# Patient Record
Sex: MALE | Race: ASIAN | URBAN - METROPOLITAN AREA
[De-identification: names, ages, dates, MRNs, and addresses within clinical notes are randomized per-mention and may not be internally consistent; named-entity substitution may affect disease eponyms.]

---

## 2018-02-01 ENCOUNTER — APPOINTMENT (OUTPATIENT)
Dept: GENERAL RADIOLOGY | Facility: CLINIC | Age: 78
DRG: 638 | End: 2018-02-01
Attending: INTERNAL MEDICINE
Payer: MEDICARE

## 2018-02-01 ENCOUNTER — TRANSFERRED RECORDS (OUTPATIENT)
Dept: HEALTH INFORMATION MANAGEMENT | Facility: CLINIC | Age: 78
End: 2018-02-01

## 2018-02-01 ENCOUNTER — HOSPITAL ENCOUNTER (INPATIENT)
Facility: CLINIC | Age: 78
LOS: 4 days | Discharge: HOME | DRG: 638 | End: 2018-02-05
Attending: INTERNAL MEDICINE | Admitting: INTERNAL MEDICINE
Payer: MEDICARE

## 2018-02-01 DIAGNOSIS — E11.00 TYPE 2 DIABETES MELLITUS WITH HYPEROSMOLARITY WITHOUT COMA, UNSPECIFIED LONG TERM INSULIN USE STATUS: Primary | ICD-10-CM

## 2018-02-01 DIAGNOSIS — E13.10 DIABETIC KETOACIDOSIS WITHOUT COMA ASSOCIATED WITH OTHER SPECIFIED DIABETES MELLITUS (H): ICD-10-CM

## 2018-02-01 PROBLEM — E11.10 DKA (DIABETIC KETOACIDOSES): Status: ACTIVE | Noted: 2018-02-01

## 2018-02-01 LAB
ALBUMIN UR-MCNC: NEGATIVE MG/DL
ANION GAP SERPL CALCULATED.3IONS-SCNC: 10 MMOL/L (ref 3–14)
ANION GAP SERPL CALCULATED.3IONS-SCNC: 14 MMOL/L (ref 3–14)
APPEARANCE UR: CLEAR
BASE DEFICIT BLDV-SCNC: 0.9 MMOL/L
BASOPHILS # BLD AUTO: 0.1 10E9/L (ref 0–0.2)
BASOPHILS NFR BLD AUTO: 0.6 %
BILIRUB UR QL STRIP: NEGATIVE
BUN SERPL-MCNC: 39 MG/DL (ref 7–30)
BUN SERPL-MCNC: 46 MG/DL (ref 7–30)
CALCIUM SERPL-MCNC: 9 MG/DL (ref 8.5–10.1)
CALCIUM SERPL-MCNC: 9.7 MG/DL (ref 8.5–10.1)
CHLORIDE SERPL-SCNC: 110 MMOL/L (ref 94–109)
CHLORIDE SERPL-SCNC: 93 MMOL/L (ref 94–109)
CO2 SERPL-SCNC: 25 MMOL/L (ref 20–32)
CO2 SERPL-SCNC: 26 MMOL/L (ref 20–32)
COLOR UR AUTO: ABNORMAL
CREAT SERPL-MCNC: 1.27 MG/DL (ref 0.66–1.25)
CREAT SERPL-MCNC: 1.63 MG/DL (ref 0.66–1.25)
DIFFERENTIAL METHOD BLD: ABNORMAL
EOSINOPHIL # BLD AUTO: 0 10E9/L (ref 0–0.7)
EOSINOPHIL NFR BLD AUTO: 0.1 %
ERYTHROCYTE [DISTWIDTH] IN BLOOD BY AUTOMATED COUNT: 11.8 % (ref 10–15)
GFR SERPL CREATININE-BSD FRML MDRD: 41 ML/MIN/1.7M2
GFR SERPL CREATININE-BSD FRML MDRD: 55 ML/MIN/1.7M2
GLUCOSE BLDC GLUCOMTR-MCNC: 202 MG/DL (ref 70–99)
GLUCOSE BLDC GLUCOMTR-MCNC: 274 MG/DL (ref 70–99)
GLUCOSE BLDC GLUCOMTR-MCNC: 282 MG/DL (ref 70–99)
GLUCOSE BLDC GLUCOMTR-MCNC: 370 MG/DL (ref 70–99)
GLUCOSE BLDC GLUCOMTR-MCNC: 442 MG/DL (ref 70–99)
GLUCOSE BLDC GLUCOMTR-MCNC: 503 MG/DL (ref 70–99)
GLUCOSE BLDC GLUCOMTR-MCNC: 514 MG/DL (ref 70–99)
GLUCOSE BLDC GLUCOMTR-MCNC: >600 MG/DL (ref 70–99)
GLUCOSE SERPL-MCNC: 423 MG/DL (ref 70–99)
GLUCOSE SERPL-MCNC: 673 MG/DL (ref 70–99)
GLUCOSE SERPL-MCNC: 944 MG/DL (ref 70–99)
GLUCOSE UR STRIP-MCNC: >499 MG/DL
HBA1C MFR BLD: 12.5 % (ref 4.3–6)
HCO3 BLDV-SCNC: 26 MMOL/L (ref 21–28)
HCT VFR BLD AUTO: 44.7 % (ref 40–53)
HGB BLD-MCNC: 14.7 G/DL (ref 13.3–17.7)
HGB UR QL STRIP: ABNORMAL
IMM GRANULOCYTES # BLD: 0.1 10E9/L (ref 0–0.4)
IMM GRANULOCYTES NFR BLD: 0.5 %
KETONES BLD-SCNC: 3.2 MMOL/L (ref 0–0.6)
KETONES UR STRIP-MCNC: 20 MG/DL
LEUKOCYTE ESTERASE UR QL STRIP: NEGATIVE
LYMPHOCYTES # BLD AUTO: 0.8 10E9/L (ref 0.8–5.3)
LYMPHOCYTES NFR BLD AUTO: 6.2 %
MAGNESIUM SERPL-MCNC: 3 MG/DL (ref 1.6–2.3)
MCH RBC QN AUTO: 31 PG (ref 26.5–33)
MCHC RBC AUTO-ENTMCNC: 32.9 G/DL (ref 31.5–36.5)
MCV RBC AUTO: 94 FL (ref 78–100)
MONOCYTES # BLD AUTO: 0.5 10E9/L (ref 0–1.3)
MONOCYTES NFR BLD AUTO: 4.2 %
NEUTROPHILS # BLD AUTO: 11.2 10E9/L (ref 1.6–8.3)
NEUTROPHILS NFR BLD AUTO: 88.4 %
NITRATE UR QL: NEGATIVE
NRBC # BLD AUTO: 0 10*3/UL
NRBC BLD AUTO-RTO: 0 /100
O2/TOTAL GAS SETTING VFR VENT: ABNORMAL %
OXYHGB MFR BLDV: 53 %
PCO2 BLDV: 52 MM HG (ref 40–50)
PH BLDV: 7.31 PH (ref 7.32–7.43)
PH UR STRIP: 5 PH (ref 5–7)
PHOSPHATE SERPL-MCNC: 2.5 MG/DL (ref 2.5–4.5)
PLATELET # BLD AUTO: 262 10E9/L (ref 150–450)
PO2 BLDV: 31 MM HG (ref 25–47)
POTASSIUM SERPL-SCNC: 4.1 MMOL/L (ref 3.4–5.3)
POTASSIUM SERPL-SCNC: 4.6 MMOL/L (ref 3.4–5.3)
RBC # BLD AUTO: 4.74 10E12/L (ref 4.4–5.9)
RBC #/AREA URNS AUTO: 1 /HPF (ref 0–2)
SODIUM SERPL-SCNC: 132 MMOL/L (ref 133–144)
SODIUM SERPL-SCNC: 146 MMOL/L (ref 133–144)
SOURCE: ABNORMAL
SP GR UR STRIP: 1.03 (ref 1–1.03)
UROBILINOGEN UR STRIP-MCNC: 0 MG/DL (ref 0–2)
WBC # BLD AUTO: 12.7 10E9/L (ref 4–11)
WBC #/AREA URNS AUTO: 1 /HPF (ref 0–2)

## 2018-02-01 PROCEDURE — 40000275 ZZH STATISTIC RCP TIME EA 10 MIN

## 2018-02-01 PROCEDURE — 25000128 H RX IP 250 OP 636: Performed by: INTERNAL MEDICINE

## 2018-02-01 PROCEDURE — 80048 BASIC METABOLIC PNL TOTAL CA: CPT | Performed by: INTERNAL MEDICINE

## 2018-02-01 PROCEDURE — 36415 COLL VENOUS BLD VENIPUNCTURE: CPT | Performed by: INTERNAL MEDICINE

## 2018-02-01 PROCEDURE — 82947 ASSAY GLUCOSE BLOOD QUANT: CPT | Performed by: INTERNAL MEDICINE

## 2018-02-01 PROCEDURE — 96361 HYDRATE IV INFUSION ADD-ON: CPT

## 2018-02-01 PROCEDURE — 99223 1ST HOSP IP/OBS HIGH 75: CPT | Mod: AI | Performed by: INTERNAL MEDICINE

## 2018-02-01 PROCEDURE — 25800025 ZZH RX 258: Performed by: INTERNAL MEDICINE

## 2018-02-01 PROCEDURE — 20000003 ZZH R&B ICU

## 2018-02-01 PROCEDURE — 25000131 ZZH RX MED GY IP 250 OP 636 PS 637: Performed by: INTERNAL MEDICINE

## 2018-02-01 PROCEDURE — 71046 X-RAY EXAM CHEST 2 VIEWS: CPT

## 2018-02-01 PROCEDURE — 96365 THER/PROPH/DIAG IV INF INIT: CPT

## 2018-02-01 PROCEDURE — 96366 THER/PROPH/DIAG IV INF ADDON: CPT

## 2018-02-01 PROCEDURE — 25000131 ZZH RX MED GY IP 250 OP 636 PS 637: Mod: GY | Performed by: INTERNAL MEDICINE

## 2018-02-01 PROCEDURE — 27210995 ZZH RX 272: Performed by: INTERNAL MEDICINE

## 2018-02-01 PROCEDURE — 83735 ASSAY OF MAGNESIUM: CPT | Performed by: INTERNAL MEDICINE

## 2018-02-01 PROCEDURE — 82010 KETONE BODYS QUAN: CPT | Performed by: INTERNAL MEDICINE

## 2018-02-01 PROCEDURE — 93005 ELECTROCARDIOGRAM TRACING: CPT

## 2018-02-01 PROCEDURE — 00000146 ZZHCL STATISTIC GLUCOSE BY METER IP

## 2018-02-01 PROCEDURE — 85025 COMPLETE CBC W/AUTO DIFF WBC: CPT | Performed by: INTERNAL MEDICINE

## 2018-02-01 PROCEDURE — 99285 EMERGENCY DEPT VISIT HI MDM: CPT | Mod: 25

## 2018-02-01 PROCEDURE — 84100 ASSAY OF PHOSPHORUS: CPT | Performed by: INTERNAL MEDICINE

## 2018-02-01 PROCEDURE — 83036 HEMOGLOBIN GLYCOSYLATED A1C: CPT | Performed by: INTERNAL MEDICINE

## 2018-02-01 PROCEDURE — 93010 ELECTROCARDIOGRAM REPORT: CPT | Performed by: INTERNAL MEDICINE

## 2018-02-01 PROCEDURE — 81001 URINALYSIS AUTO W/SCOPE: CPT | Performed by: INTERNAL MEDICINE

## 2018-02-01 PROCEDURE — 82805 BLOOD GASES W/O2 SATURATION: CPT | Performed by: INTERNAL MEDICINE

## 2018-02-01 PROCEDURE — 25000125 ZZHC RX 250: Performed by: INTERNAL MEDICINE

## 2018-02-01 RX ORDER — POTASSIUM CHLORIDE 1.5 G/1.58G
20-40 POWDER, FOR SOLUTION ORAL
Status: DISCONTINUED | OUTPATIENT
Start: 2018-02-01 | End: 2018-02-05 | Stop reason: HOSPADM

## 2018-02-01 RX ORDER — DEXTROSE MONOHYDRATE 25 G/50ML
25-50 INJECTION, SOLUTION INTRAVENOUS
Status: DISCONTINUED | OUTPATIENT
Start: 2018-02-01 | End: 2018-02-05 | Stop reason: HOSPADM

## 2018-02-01 RX ORDER — POTASSIUM CHLORIDE 1500 MG/1
20-40 TABLET, EXTENDED RELEASE ORAL
Status: DISCONTINUED | OUTPATIENT
Start: 2018-02-01 | End: 2018-02-05 | Stop reason: HOSPADM

## 2018-02-01 RX ORDER — DEXTROSE MONOHYDRATE, SODIUM CHLORIDE, AND POTASSIUM CHLORIDE 50; 1.49; 4.5 G/1000ML; G/1000ML; G/1000ML
INJECTION, SOLUTION INTRAVENOUS CONTINUOUS
Status: DISCONTINUED | OUTPATIENT
Start: 2018-02-01 | End: 2018-02-02

## 2018-02-01 RX ORDER — NITROGLYCERIN 0.4 MG/1
0.4 TABLET SUBLINGUAL EVERY 5 MIN PRN
Status: DISCONTINUED | OUTPATIENT
Start: 2018-02-01 | End: 2018-02-05 | Stop reason: HOSPADM

## 2018-02-01 RX ORDER — MAGNESIUM SULFATE HEPTAHYDRATE 40 MG/ML
4 INJECTION, SOLUTION INTRAVENOUS EVERY 4 HOURS PRN
Status: DISCONTINUED | OUTPATIENT
Start: 2018-02-01 | End: 2018-02-05 | Stop reason: HOSPADM

## 2018-02-01 RX ORDER — LOSARTAN POTASSIUM AND HYDROCHLOROTHIAZIDE 12.5; 1 MG/1; MG/1
1 TABLET ORAL AT BEDTIME
COMMUNITY

## 2018-02-01 RX ORDER — SODIUM CHLORIDE 450 MG/100ML
1000 INJECTION, SOLUTION INTRAVENOUS CONTINUOUS
Status: DISCONTINUED | OUTPATIENT
Start: 2018-02-01 | End: 2018-02-01

## 2018-02-01 RX ORDER — POTASSIUM CHLORIDE 7.45 MG/ML
10 INJECTION INTRAVENOUS
Status: DISCONTINUED | OUTPATIENT
Start: 2018-02-01 | End: 2018-02-05 | Stop reason: HOSPADM

## 2018-02-01 RX ORDER — SODIUM CHLORIDE 450 MG/100ML
INJECTION, SOLUTION INTRAVENOUS CONTINUOUS
Status: DISCONTINUED | OUTPATIENT
Start: 2018-02-01 | End: 2018-02-02

## 2018-02-01 RX ORDER — POTASSIUM CL/LIDO/0.9 % NACL 10MEQ/0.1L
10 INTRAVENOUS SOLUTION, PIGGYBACK (ML) INTRAVENOUS
Status: DISCONTINUED | OUTPATIENT
Start: 2018-02-01 | End: 2018-02-05 | Stop reason: HOSPADM

## 2018-02-01 RX ORDER — NICOTINE POLACRILEX 4 MG
15-30 LOZENGE BUCCAL
Status: DISCONTINUED | OUTPATIENT
Start: 2018-02-01 | End: 2018-02-05 | Stop reason: HOSPADM

## 2018-02-01 RX ORDER — POTASSIUM CHLORIDE 29.8 MG/ML
20 INJECTION INTRAVENOUS
Status: DISCONTINUED | OUTPATIENT
Start: 2018-02-01 | End: 2018-02-05 | Stop reason: HOSPADM

## 2018-02-01 RX ORDER — LIDOCAINE 40 MG/G
CREAM TOPICAL
Status: DISCONTINUED | OUTPATIENT
Start: 2018-02-01 | End: 2018-02-05 | Stop reason: HOSPADM

## 2018-02-01 RX ADMIN — SODIUM CHLORIDE 1000 ML: 9 INJECTION, SOLUTION INTRAVENOUS at 18:54

## 2018-02-01 RX ADMIN — SODIUM CHLORIDE 8 UNITS/HR: 9 INJECTION, SOLUTION INTRAVENOUS at 22:07

## 2018-02-01 RX ADMIN — SODIUM CHLORIDE 1000 ML: 9 INJECTION, SOLUTION INTRAVENOUS at 13:16

## 2018-02-01 RX ADMIN — SODIUM CHLORIDE 5.5 UNITS/HR: 9 INJECTION, SOLUTION INTRAVENOUS at 18:52

## 2018-02-01 RX ADMIN — SODIUM CHLORIDE 1000 ML: 9 INJECTION, SOLUTION INTRAVENOUS at 15:36

## 2018-02-01 RX ADMIN — SODIUM CHLORIDE: 4.5 INJECTION, SOLUTION INTRAVENOUS at 19:57

## 2018-02-01 RX ADMIN — POTASSIUM CHLORIDE, DEXTROSE MONOHYDRATE AND SODIUM CHLORIDE: 150; 5; 450 INJECTION, SOLUTION INTRAVENOUS at 23:14

## 2018-02-01 RX ADMIN — SODIUM CHLORIDE 5.5 UNITS/HR: 9 INJECTION, SOLUTION INTRAVENOUS at 14:11

## 2018-02-01 ASSESSMENT — ACTIVITIES OF DAILY LIVING (ADL)
COGNITION: 0 - NO COGNITION ISSUES REPORTED
AMBULATION: 0-->INDEPENDENT
TRANSFERRING: 0-->INDEPENDENT
FALL_HISTORY_WITHIN_LAST_SIX_MONTHS: NO
SWALLOWING: 0-->SWALLOWS FOODS/LIQUIDS WITHOUT DIFFICULTY
TOILETING: 0-->INDEPENDENT
BATHING: 0-->INDEPENDENT
DRESS: 0-->INDEPENDENT
RETIRED_EATING: 0-->INDEPENDENT
RETIRED_COMMUNICATION: 0-->UNDERSTANDS/COMMUNICATES WITHOUT DIFFICULTY

## 2018-02-01 ASSESSMENT — ENCOUNTER SYMPTOMS
FLANK PAIN: 0
ABDOMINAL PAIN: 0
SHORTNESS OF BREATH: 0
POLYDIPSIA: 1
APPETITE CHANGE: 1
FEVER: 0
FATIGUE: 1

## 2018-02-01 NOTE — H&P
[facility], [place] History and Physical    Bun Avani MRN# [id number]   Age: 77 year old YOB: 1940     Date of Admission:  2/1/2018    Home clinic: [facility]   Primary care provider: Genet Pittman          Assessment and Plan:   Assessment:   Mr. Varma is a very pleasant 77-year-old [place]ian man who came to attention today with weakness and urinary frequency.  In the emergency department he is found to be significantly hyperglycemic and at least moderately dehydrated.  His glucose values were above the measurable threshold, anion gap was normal but he did have urinary ketones.    Notably the patient's prior medical history is negative for diagnosis of diabetes though he has been monitored in the past for hyperglycemia.  He has dyslipidemia and hypertension but otherwise is a quite healthy man.    Diagnoses:  1.  New diagnosis diabetes.  Patient presents  with severe hyperglycemia but not quite diabetic ketoacidosis despite ketonuria.  He has no evidence for anion gap acidosis but he is at least moderately dehydrated.  2.  Acute kidney injury.  Baseline creatinine probably about 1.0, on presentation to the outside clinic creatinine was 1.9.  3.  Hypertension.  This is typically managed on Hyzaar.  4.  Dyslipidemia.      Plan:   1.  Admit to intermediate care for insulin drip.  2.  Hold antihypertensives at this time.  3.  Diabetic ketoacidosis protocol to guide management overnight.  4.  Serial basic metabolic panel to include magnesium and phosphorus monitoring and replacement as needed every 6 hours.             Chief Complaint:   Fatigue, loss of appetite, urinary frequency present for the last 2 weeks.     History is obtained from the patient and EMR.    Mr. Chow primarily speaks Cambodian but is very happy to give a full history.  He has lived here since 1984 and indicates with some difficulty that he has been treated for hypertension and dyslipidemia.  This is confirmed on reviewing his  documentation from the [facility].    Patient clearly describes urinary frequency every 2 hours over the last couple of weeks.  He is really not had much of an appetite at all but has been drinking large volumes of water.  He feels it is soon as he drinks he needs to urinate again.  It is not completely clear that he has been losing weight though he suspects that he has been because his pants are slightly looser than usual.  He denies specific problems with blurry vision.  He has not had shortness of breath, cough, nausea/vomiting diarrhea or stool change.  He apparently is had a rather nonspecific loss of appetite though.  He denies significant dizziness but overall just feels extremely weak.  I believe that he has been taking his medications as usual.          Past Medical History:   Hypertension.  Dyslipidemia.         Past Surgical History:    No past surgical history on file.          Social History:     Social History   Substance Use Topics     Smoking status:  Lifelong non-smoker.     Smokeless tobacco:  None.     Alcohol use  nondrinker.             Family History:   No known relevant family medical history.         Allergies:   Not on File          Medications:     Prescriptions Prior to Admission   Medication Sig Dispense Refill Last Dose     losartan-hydrochlorothiazide (HYZAAR) 100-12.5 MG per tablet Take 1 tablet by mouth At Bedtime   1/31/2018 at Unknown time     Simvastatin (ZOCOR PO) Take 10 mg by mouth At Bedtime   1/31/2018 at Unknown time             Review of Systems:   A comprehensive review of systems was performed and found to be negative except as described in this note           Physical Exam:   Vitals were reviewed  Temp: 98.6  F (37  C) Temp src: Oral BP: 145/86 Pulse: 108 Heart Rate: 84 Resp: 14 SpO2: 99 % O2 Device: None (Room air)    Constitutional: Awake, alert, cooperative, no apparent distress, and appears stated age.  Eyes: Lids and lashes normal, pupils equal, round  and reactive to light, extra ocular muscles intact, sclera clear, conjunctiva normal.  ENT: Normocephalic, without obvious abnormality, atramatic, sinuses nontender on palpation, external ears without lesions, oral pharynx with moist mucus membranes, tonsils without erythema or exudates, gums normal and good dentition.  Neck: Supple, symmetrical, trachea midline, no adenopathy, thyroid symmetric, not enlarged and no tenderness, skin normal.  Hematologic / Lymphatic: No cervical lymphadenopathy and no supraclavicular lymphadenopathy.  Back: Symmetric, no curvature, spinous processes are non-tender on palpation, paraspinous muscles are non-tender on palpation, no costal vertebral tenderness.  Lungs: No increased work of breathing, good air exchange, clear to auscultation bilaterally, no crackles or wheezing.  Cardiovascular: Regular rate and rhythm, normal S1 and S2, no S3 or S4, and no murmur noted.  Abdomen: No scars, normal bowel sounds, soft, non-distended, non-tender, no masses palpated, no hepatosplenomegally.  Musculoskeletal: No redness, warmth, or swelling of the joints.  Full range of motion noted.  Motor strength is 5 out of 5 all extremities bilaterally.  Tone is normal.  Neurologic: Awake, alert, oriented to name, place and time.  Cranial nerves II-XII are grossly intact.  Motor is 5 out of 5 bilaterally.  Cerebellar finger to nose, heel to shin intact.  Sensory is intact.  Babinski down going, Romberg negative, and gait is normal.  Neuropsychiatric: Normal affect, mood, orientation, memory and insight.  Skin: No rashes, erythema, pallor, petechia or purpura.          Data:     Results for orders placed or performed during the hospital encounter of 02/01/18 (from the past 24 hour(s))   Glucose by meter   Result Value Ref Range    Glucose >600 (HH) 70 - 99 mg/dL   XR Chest 2 Views    Narrative    XR CHEST 2 VW 2/1/2018 1:34 PM    COMPARISON: None.    HISTORY: Diabetic ketoacidosis.      Impression     IMPRESSION: Mildly enlarged cardiac silhouette. No focal airspace  disease, pleural effusion or pneumothorax.    BARNEY DE LEON MD   Basic metabolic panel   Result Value Ref Range    Sodium 132 (L) 133 - 144 mmol/L    Potassium 4.6 3.4 - 5.3 mmol/L    Chloride 93 (L) 94 - 109 mmol/L    Carbon Dioxide 25 20 - 32 mmol/L    Anion Gap 14 3 - 14 mmol/L    Glucose 944 (HH) 70 - 99 mg/dL    Urea Nitrogen 46 (H) 7 - 30 mg/dL    Creatinine 1.63 (H) 0.66 - 1.25 mg/dL    GFR Estimate 41 (L) >60 mL/min/1.7m2    GFR Estimate If Black 50 (L) >60 mL/min/1.7m2    Calcium 9.7 8.5 - 10.1 mg/dL   CBC with platelets differential   Result Value Ref Range    WBC 12.7 (H) 4.0 - 11.0 10e9/L    RBC Count 4.74 4.4 - 5.9 10e12/L    Hemoglobin 14.7 13.3 - 17.7 g/dL    Hematocrit 44.7 40.0 - 53.0 %    MCV 94 78 - 100 fl    MCH 31.0 26.5 - 33.0 pg    MCHC 32.9 31.5 - 36.5 g/dL    RDW 11.8 10.0 - 15.0 %    Platelet Count 262 150 - 450 10e9/L    Diff Method Automated Method     % Neutrophils 88.4 %    % Lymphocytes 6.2 %    % Monocytes 4.2 %    % Eosinophils 0.1 %    % Basophils 0.6 %    % Immature Granulocytes 0.5 %    Nucleated RBCs 0 0 /100    Absolute Neutrophil 11.2 (H) 1.6 - 8.3 10e9/L    Absolute Lymphocytes 0.8 0.8 - 5.3 10e9/L    Absolute Monocytes 0.5 0.0 - 1.3 10e9/L    Absolute Eosinophils 0.0 0.0 - 0.7 10e9/L    Absolute Basophils 0.1 0.0 - 0.2 10e9/L    Abs Immature Granulocytes 0.1 0 - 0.4 10e9/L    Absolute Nucleated RBC 0.0    Hemoglobin A1c   Result Value Ref Range    Hemoglobin A1C 12.5 (H) 4.3 - 6.0 %   Ketone Beta-Hydroxybutyrate Quantitative   Result Value Ref Range    Ketone Quantitative 3.2 (HH) 0.0 - 0.6 mmol/L   Blood gas venous and oxyhgb   Result Value Ref Range    Ph Venous 7.31 (L) 7.32 - 7.43 pH    PCO2 Venous 52 (H) 40 - 50 mm Hg    PO2 Venous 31 25 - 47 mm Hg    Bicarbonate Venous 26 21 - 28 mmol/L    FIO2 JAUN     Oxyhemoglobin Venous 53 %    Base Deficit Venous 0.9 mmol/L   Glucose by meter   Result Value Ref  Range    Glucose >600 (HH) 70 - 99 mg/dL   UA with Microscopic reflex to Culture   Result Value Ref Range    Color Urine Straw     Appearance Urine Clear     Glucose Urine >499 (A) NEG^Negative mg/dL    Bilirubin Urine Negative NEG^Negative    Ketones Urine 20 (A) NEG^Negative mg/dL    Specific Gravity Urine 1.026 1.003 - 1.035    Blood Urine Small (A) NEG^Negative    pH Urine 5.0 5.0 - 7.0 pH    Protein Albumin Urine Negative NEG^Negative mg/dL    Urobilinogen mg/dL 0.0 0.0 - 2.0 mg/dL    Nitrite Urine Negative NEG^Negative    Leukocyte Esterase Urine Negative NEG^Negative    Source Midstream Urine     WBC Urine 1 0 - 2 /HPF    RBC Urine 1 0 - 2 /HPF   Glucose by meter   Result Value Ref Range    Glucose >600 (HH) 70 - 99 mg/dL   Glucose by meter   Result Value Ref Range    Glucose >600 (HH) 70 - 99 mg/dL   Glucose   Result Value Ref Range    Glucose 673 (HH) 70 - 99 mg/dL   Glucose by meter   Result Value Ref Range    Glucose 503 (HH) 70 - 99 mg/dL   Glucose by meter   Result Value Ref Range    Glucose 514 (HH) 70 - 99 mg/dL   Glucose by meter   Result Value Ref Range    Glucose 442 (H) 70 - 99 mg/dL       All imaging studies reviewed by me.      Attestation:  I have reviewed today's vital signs, notes, medications, labs and imaging.  Total time: 35 minutes     Morales Chung MD

## 2018-02-01 NOTE — ED NOTES
"[facility]  ED Nurse Handoff Report    Godfrey Varma is a 77 year old male   ED Chief complaint: Fatigue  . ED Diagnosis:   Final diagnoses:   Diabetic ketoacidosis without coma associated with other specified diabetes mellitus (H)     Allergies: Not on File    Code Status: Full Code  Activity level - Baseline/Home:  Independent. Activity Level - Current:   Independent. Lift room needed: No. Bariatric: No   Needed: No   Isolation: No. Infection: Not Applicable.     Vital Signs:   Vitals:    02/01/18 1308 02/01/18 1320 02/01/18 1420 02/01/18 1421   BP: 161/88 150/87 151/81    Pulse:       Resp:       Temp:       TempSrc:       SpO2: 93% 97%  93%   Weight:           Cardiac Rhythm:  ,      Pain level: 0-10 Pain Scale: 0  Patient confused: No. Patient Falls Risk: Yes.   Elimination Status: Has voided   Patient Report - Initial Complaint: fatigue.. Focused Assessment: Pt states he has been having extreme thirst for past 2 weeks along with polyuria, fatigue and weakness. When asked if pt is diabetic he states yes and that he takes 10 mg of some pill that he was unable to tell me the name of. When asked if pt is a type 1 or 2 diabetic he states he is a type 1. When asked if pt has been checking his sugars he states \"not in a long time, maybe a year?\". Pt's skin dry along with mucous membranes.   Tests Performed: EKG, lab work, chest xray, lab work, UA. Abnormal Results:   Labs Ordered and Resulted from Time of ED Arrival Up to the Time of Departure from the ED   GLUCOSE BY METER - Abnormal; Notable for the following:        Result Value    Glucose >600 (*)     All other components within normal limits   BASIC METABOLIC PANEL - Abnormal; Notable for the following:     Sodium 132 (*)     Chloride 93 (*)     Glucose 944 (*)     Urea Nitrogen 46 (*)     Creatinine 1.63 (*)     GFR Estimate 41 (*)     GFR Estimate If Black 50 (*)     All other components within normal limits   CBC WITH PLATELETS DIFFERENTIAL " - Abnormal; Notable for the following:     WBC 12.7 (*)     Absolute Neutrophil 11.2 (*)     All other components within normal limits   HEMOGLOBIN A1C - Abnormal; Notable for the following:     Hemoglobin A1C 12.5 (*)     All other components within normal limits   KETONE BETA-HYDROXYBUTYRATE QUANTITATIVE - Abnormal; Notable for the following:     Ketone Quantitative 3.2 (*)     All other components within normal limits   BLOOD GAS VENOUS AND OXYHGB - Abnormal; Notable for the following:     Ph Venous 7.31 (*)     PCO2 Venous 52 (*)     All other components within normal limits   GLUCOSE BY METER - Abnormal; Notable for the following:     Glucose >600 (*)     All other components within normal limits   ROUTINE UA WITH MICROSCOPIC REFLEX TO CULTURE   GLUCOSE MONITOR NURSING POCT   CARDIAC CONTINUOUS MONITORING   PERIPHERAL IV CATHETER   .   Treatments provided: NS bolus x 2, insulin infusing at 5.5 units/her currently  Family Comments: Pt here with wife  OBS brochure/video discussed/provided to patient:  N/A  ED Medications:   Medications   0.9% sodium chloride BOLUS (0 mLs Intravenous Stopped 2/1/18 1439)     Followed by   0.45% sodium chloride infusion (not administered)   insulin 1 unit/1 mL in NS (NovoLIN, HumuLIN Regular) drip -ED DKA algorithm (5.5 Units/hr Intravenous New Bag 2/1/18 1411)     Drips infusing:  Yes  For the majority of the shift, the patient's behavior Green. Interventions performed were N/A.     Severe Sepsis OR Septic Shock Diagnosis Present: No      ED Nurse Name/Phone Number: Keyona Cardenas,   2:52 PM

## 2018-02-01 NOTE — ED NOTES
"Reports extreme thirst the last week.  Reports 2 weeks ago had fever.  Reports feeling tired\" all the time\".  Gait slow.  "

## 2018-02-01 NOTE — PHARMACY-ADMISSION MEDICATION HISTORY
Admission medication history interview status for this patient is complete. See [facility] admission navigator for allergy information, prior to admission medications and immunization status.     Medication history interview source(s):Patient  Medication history resources (including written lists, pill bottles, clinic record):None    Changes made to PTA medication list:  Added: all  Deleted: none  Changed: none    Actions taken by pharmacist (provider contacted, etc):None     Additional medication history information:verified both meds with [facility] (patient's primary pharmacy )    Medication reconciliation/reorder completed by provider prior to medication history? No    For patients on insulin therapy: no (Yes/No)   Lantus/levemir/NPH/Mix 70/30 dose: ___ in AM/PM or twice daily   Sliding scale Novolog Y/N   If Yes, do you have a baseline novolog pre-meal dose: ______units with meals   Patients eat three meals a day: Y/N ---  How many episodes of hypoglycemia (low blood glucose) do you have weekly: ---   How many missed doses do you have a week: ---  How many times do you check your blood glucose per day: ---  Any Barriers to therapy: cost of medications/comfortable with giving injections (if applicable)/ comfortable and confident with current diabetes regimen ---      Prior to Admission medications    Medication Sig Last Dose Taking? Auth Provider   losartan-hydrochlorothiazide (HYZAAR) 100-12.5 MG per tablet Take 1 tablet by mouth At Bedtime 1/31/2018 at Unknown time Yes Unknown, Entered By History   Simvastatin (ZOCOR PO) Take 10 mg by mouth At Bedtime 1/31/2018 at Unknown time Yes Unknown, Entered By History

## 2018-02-01 NOTE — IP AVS SNAPSHOT
MRN:[id number]                      After Visit Summary   2/1/2018    Godfrey Varma    MRN: [id number]           Thank you!     Thank you for choosing [facility] for your care. Our goal is always to provide you with excellent care. Hearing back from our patients is one way we can continue to improve our services. Please take a few minutes to complete the written survey that you may receive in the mail after you visit. If you would like to speak to someone directly about your visit please contact Patient Relations at [phone]. Thank you!          Patient Information     Date Of Birth          1940        Designated Caregiver       Most Recent Value    Caregiver    Will someone help with your care after discharge? no      About your hospital stay     You were admitted on:  February 1, 2018 You last received care in the:  [facility]    You were discharged on:  February 5, 2018        Reason for your hospital stay       Diagnosed diabetes mellitus type 2 with hyperosmolality and keratosis                  Who to Call     For medical emergencies, please call 911.  For non-urgent questions about your medical care, please call your primary care provider or clinic, [phone]          Attending Provider     Provider Specialty    Solange Blevins MD Emergency Medicine    [facility]       Primary Care Provider Office Phone # Fax #    [facility] [phone]      After Care Instructions     Activity       Your activity upon discharge: activity as tolerated            Diet       Follow this diet upon discharge: Orders Placed This Encounter      Room Service      Moderate Consistent CHO Diet            Monitor and record       blood glucose 4 times a day, before meals and at bedtime                  Follow-up Appointments     Follow-up and recommended labs and tests        Follow up with primary care provider, [facility]  "[facility], within 7 days for hospital follow- up.                  Pending Results     No orders found from 2018 to 2018.            Statement of Approval     Ordered          18 1305  I have reviewed and agree with all the recommendations and orders detailed in this document.  EFFECTIVE NOW     Approved and electronically signed by:  Junior Gonzalez MD             Admission Information     Date & Time Provider Department Dept. Phone    2018 [facility] Chung MD [facility] [phone]      Your Vitals Were     Blood Pressure Pulse Temperature Respirations Weight Pulse Oximetry    129/67 (BP Location: Left arm) 108 96.2  F (35.7  C) (Oral) 16 73.7 kg (162 lb 7.7 oz) 94%      MyChart Information     [employer] lets you send messages to your doctor, view your test results, renew your prescriptions, schedule appointments and more. To sign up, go to [email or web address] . Click on \"Log in\" on the left side of the screen, which will take you to the Welcome page. Then click on \"Sign up Now\" on the right side of the page.     You will be asked to enter the access code listed below, as well as some personal information. Please follow the directions to create your username and password.     Your access code is: [email or web address]  Expires: 5/3/2018  4:10 PM     Your access code will  in 90 days. If you need help or a new code, please call your [place] clinic or [phone].        Care EveryWhere ID     This is your Care EveryWhere ID. This could be used by other organizations to access your [place] medical records  [id number]        Equal Access to Services     CHRIS DURANT : Hadii lakia Beckwiht, washantida yenifer, qaybta lilianaaljeff turpin. So [facility] [phone].    ATENCIÓN: Si habla español, tiene a wilson disposición servicios gratuitos de asistencia lingüística. Llame al [phone].    We comply with applicable " federal civil rights laws and [place] laws. We do not discriminate on the basis of race, color, national origin, age, disability, sex, sexual orientation, or gender identity.               Review of your medicines      START taking        Dose / Directions    Alcohol Prep Pads   Used for:  Type 2 diabetes mellitus with hyperosmolarity without coma, unspecified long term insulin use status (H)   Notes to Patient:  Use with blood sugar checks          Dose:  1 each   1 each 4 times daily   Quantity:  200 each   Refills:  1       blood glucose lancets standard   Commonly known as:  no brand specified   Used for:  Type 2 diabetes mellitus with hyperosmolarity without coma, unspecified long term insulin use status (H)        Use to test blood sugar 3 times daily or as directed.   Quantity:  100 each   Refills:  11       blood glucose monitoring meter device kit   Used for:  Type 2 diabetes mellitus with hyperosmolarity without coma, unspecified long term insulin use status (H)        Use to test blood sugars 3 times daily or as directed.   Quantity:  1 kit   Refills:  0       * blood glucose monitoring test strip   Commonly known as:  no brand specified   Used for:  Type 2 diabetes mellitus with hyperosmolarity without coma, unspecified long term insulin use status (H)        Use to test blood sugars 4 times daily or as directed   Quantity:  100 strip   Refills:  1       * blood glucose monitoring test strip   Commonly known as:  no brand specified   Used for:  Type 2 diabetes mellitus with hyperosmolarity without coma, unspecified long term insulin use status (H)        Use to test blood sugars 3 times daily or as directed   Quantity:  100 strip   Refills:  1       insulin glargine 100 UNIT/ML injection   Commonly known as:  LANTUS SOLOSTAR   Used for:  Type 2 diabetes mellitus with hyperosmolarity without coma, unspecified long term insulin use status (H)        Dose:  30 Units   Inject 30 Units Subcutaneous every  morning Lantus Solostar Pen   Quantity:  12 mL   Refills:  1       * Notice:  This list has 2 medication(s) that are the same as other medications prescribed for you. Read the directions carefully, and ask your doctor or other care provider to review them with you.      CONTINUE these medicines which have NOT CHANGED        Dose / Directions    losartan-hydrochlorothiazide 100-12.5 MG per tablet   Commonly known as:  HYZAAR        Dose:  1 tablet   Take 1 tablet by mouth At Bedtime   Refills:  0       ZOCOR PO        Dose:  10 mg   Take 10 mg by mouth At Bedtime   Refills:  0            Where to get your medicines      These medications were sent to [place], [place] - [address] [address] [facility]  [address] [address] [facility] [address]     Phone:  [phone]     Alcohol Prep Pads    blood glucose lancets standard    blood glucose monitoring meter device kit    blood glucose monitoring test strip         Some of these will need a paper prescription and others can be bought over the counter. Ask your nurse if you have questions.     Bring a paper prescription for each of these medications     blood glucose monitoring test strip    insulin glargine 100 UNIT/ML injection                Protect others around you: Learn how to safely use, store and throw away your medicines at www.disposemymeds.org.             Medication List: This is a list of all your medications and when to take them. Check marks below indicate your daily home schedule. Keep this list as a reference.      Medications           Morning Afternoon Evening Bedtime As Needed    Alcohol Prep Pads   1 each 4 times daily   Notes to Patient:  Use with blood sugar checks                                           blood glucose lancets standard   Commonly known as:  no brand specified   Use to test blood sugar 3 times daily or as directed.                                         blood glucose monitoring meter device kit   Use to test  blood sugars 3 times daily or as directed.                                         * blood glucose monitoring test strip   Commonly known as:  no brand specified   Use to test blood sugars 4 times daily or as directed                                         * blood glucose monitoring test strip   Commonly known as:  no brand specified   Use to test blood sugars 3 times daily or as directed                                   insulin glargine 100 UNIT/ML injection   Commonly known as:  LANTUS SOLOSTAR   Inject 30 Units Subcutaneous every morning Lantus Solostar Pen   Next Dose Due:  2-6-2018                                   losartan-hydrochlorothiazide 100-12.5 MG per tablet   Commonly known as:  HYZAAR   Take 1 tablet by mouth At Bedtime                                   ZOCOR PO   Take 10 mg by mouth At Bedtime   Last time this was given:  10 mg on 2/4/2018  9:04 PM   Next Dose Due:  2-5-2018                                   * Notice:  This list has 2 medication(s) that are the same as other medications prescribed for you. Read the directions carefully, and ask your doctor or other care provider to review them with you.              More Information      Know the Difference Between High and Low Glucose  High blood glucose (hyperglycemia)  Symptoms    Extreme thirst    Urinating more often    Headache    Hunger    Blurred vision    Feeling drowsy or tired    Slow healing after illness or injury    Frequent infections  Possible causes    Too much food    Wrong type or amount of diabetes medicine or insulin    Stress or illness    Some medicines  What to do    Test your blood glucose regularly.    If you take insulin: take correction insulin.    Check ketones, if your blood glucose is over 240.    Call the doctor if your glucose level is often above your goal. The doctor may need to change your insulin or diabetes medicine.    Call your care team if you are ill.  Low blood glucose (hypoglycemia)   Symptoms    Shaking,  sweating, fast heartbeat    Feeling dizzy, tired or weak    Feeling anxious and irritable    Feeling nervous, crabby or confused    Hunger    Vision problems, headache    Numb or tingling mouth  Possible causes    Not enough carbohydrate    Too much insulin or diabetes medicine    More activity than normal    Stress, alcohol, some medicines  What to do    Test your blood glucose:    If under 70, have 1/2 cup juice or 3 to 4 glucose tablets.    If under 50, have 1 cup juice or 6 to 8 glucose tablets.    Repeat test every 15 minutes until blood glucose is between 70 and 100. Call 911 if it does not get better.    Call your care team if you have low blood glucose two or more times per week.  For informational purposes only. Not to replace the advice of your health care provider.   Copyright   2006 [facility]. All rights reserved. [employer] 011468 - REV 08/16.            Step-by-Step  Checking Your Blood Sugar    Date Last Reviewed: 5/1/2016 2000-2017 [employer]. [address] [place] [address], [place] [address]. All rights reserved. This information is not intended as a substitute for professional medical care. Always follow your healthcare professional's instructions.

## 2018-02-01 NOTE — ED NOTES
Pt's recheck of blood glucose was 673 which is a large decline from original blood glucose of 944. Due to drop, discussed with MD about lowering the insulin dosage. MD states wanting to switch insulin to 3 units/hr.

## 2018-02-01 NOTE — ED NOTES
Due to pt's glucose level reading high on meters, and not knowing how fast pt's glucose is dropping from 944 since meters don't give a value till less than 600, so order for blood glucose obtained from MD.

## 2018-02-01 NOTE — IP AVS SNAPSHOT
[facility]    [address] [facility] [address]    Phone:  [phone]    Fax:  [phone]                                       After Visit Summary   2/1/2018    Godfrey Varma    MRN: [id number]           After Visit Summary Signature Page     I have received my discharge instructions, and my questions have been answered. I have discussed any challenges I see with this plan with the nurse or doctor.    ..........................................................................................................................................  Patient/Patient Representative Signature      ..........................................................................................................................................  Patient Representative Print Name and Relationship to Patient    ..................................................               ................................................  Date                                            Time    ..........................................................................................................................................  Reviewed by Signature/Title    ...................................................              ..............................................  Date                                                            Time

## 2018-02-01 NOTE — ED PROVIDER NOTES
History     Chief Complaint:  Fatigue    PURVI Varma is a 77 year old male who presents with fatigue. The patient reports that he has been extremely thirsty and fatigued for the past two weeks. During this time he has been drinking normally but he has had poor oral intake. The patient was seen in clinic today where he was found to have a glucose of over 700 and he was then referred to present to the emergency department. He does report that he recorded a fever two weeks ago but he denies having had any fevers since that time. The patient denies having experienced any shortness of breath, abdominal pain, or flank pain. There was a vague, unclear report that the patient may have a history of diabetes, requiring insulin per our nursing staff, though this is not verified.    Allergies:  No Known Drug Allergies      Medications:    Hyzaar   Zocor    Past Medical History:    Hypertension    Past Surgical History:    History reviewed. No pertinent past surgical history.     Family History:    The patient denies any relevant family medical history.     Social History:  The patient was accompanied to the ED by his significant other.  Marital Status:  Single [1]    Review of Systems   Constitutional: Positive for appetite change and fatigue. Negative for fever.   Respiratory: Negative for shortness of breath.    Gastrointestinal: Negative for abdominal pain.   Endocrine: Positive for polydipsia.   Genitourinary: Negative for flank pain.   All other systems reviewed and are negative.    Physical Exam   Vitals:  Patient Vitals for the past 24 hrs:   BP Temp Temp src Pulse Heart Rate Resp SpO2 Weight   02/01/18 1500 147/90 - - - 80 - 95 % -   02/01/18 1421 - - - - - - 93 % -   02/01/18 1420 151/81 - - - - - - -   02/01/18 1320 150/87 - - - 85 - 97 % -   02/01/18 1308 161/88 - - - - - 93 % -   02/01/18 1240 (!) 165/96 98.4  F (36.9  C) Oral 108 - 16 97 % 77.7 kg (171 lb 4.8 oz)        Physical Exam   Constitutional: He is  cooperative.   HENT:   Right Ear: Tympanic membrane normal.   Left Ear: Tympanic membrane normal.   Mouth/Throat: Oropharynx is clear and moist and mucous membranes are normal.   Eyes: Conjunctivae are normal.   Neck: Normal range of motion.   Cardiovascular: Regular rhythm and normal heart sounds.    Pulmonary/Chest: Effort normal and breath sounds normal.   Abdominal: Soft. Normal appearance and bowel sounds are normal. There is no rebound and no guarding.   Musculoskeletal: Normal range of motion.   Lymphadenopathy:     He has no cervical adenopathy.   Neurological: He is alert.   Skin: Skin is warm and dry.   Psychiatric: He has a normal mood and affect.       Emergency Department Course   Imaging:  Radiology findings were communicated with the patient who voiced understanding of the findings.  XR chest 2 views:  IMPRESSION: Mildly enlarged cardiac silhouette. No focal airspace  disease, pleural effusion or pneumothorax.  Reading per radiology.     Laboratory:  Laboratory findings were communicated with the patient who voiced understanding of the findings.  Glucose (1257): >600(HH)  Glucose(1532): >600(HH)  BMP: NA: 132(L), Chloride: 93(L), Glucose: 944(HH), BUN: 46(H), Creatinine: 1.63(H), GFR; 41(L), o/w WNL   CBC: WBC: 12.7(H), Neutrophil: 11.2(H), o/w WNL (HGB 14.7, )  Hemoglobin A1c: 12.5(H)  Ketone Beta-hydroxybutyrate quantitative: 3.2(HH)  Blood gas venous and oxyhgb(1358): pH: 7.31(L), PCO2: 52(H), PO2: 31, Bicarbonate: 26, Oxyhemoglobin: 53, base deficit: 0.9  UA: Glucose: >499, Urineketon: 20, Blood: small    Interventions:  1316 Normal Saline 1000 mL IV   1411 5.5 units/hr Insulin drip IV  1539 Normal Saline 1000 mL IV     Emergency Department Course:  Nursing notes and vitals reviewed.  I performed an exam of the patient as documented above.   IV was inserted and blood was drawn for laboratory testing, results above.  The patient provided a urine sample here in the emergency department. This  was sent for laboratory testing, findings above.   The patient was sent for a XR while in the emergency department, results above.      1442 I spoke with Dr. Chung regarding the patient    I discussed the treatment plan with the patient. They expressed understanding of this plan and consented to admission. I discussed the patient with Dr. Chung, who will admit the patient to a monitored bed for further evaluation and treatment.     I personally reviewed the laboratory results with the patient and answered all related questions prior to admission    Impression & Plan      Medical Decision Making:  Godfrey Varma is a 77 year old male referred from clinic for unusual thirst and fatigue for the past two weeks. He seemed to indicate to the nurse that he has been diagnosed with diabetes in the pas, though I see no record of this in the chart. He was severely hyperglycemic ads well as being ketotic and mildly acidotic. He is not comatose. He was given IV fluids and insulin was initiated. Thus far his glucose is still above the limits of the meter. I discussed the case with Dr. Chung of the hospitalist service. We will [facility] the patient to [facility] for continued insulin drip, fluids. And further evaluation dn treatment.     Diagnosis:    ICD-10-CM    1. Diabetic ketoacidosis without coma associated with other specified diabetes mellitus (H) E13.10 UA with Microscopic reflex to Culture     Glucose by meter     Glucose by meter     Glucose by meter     Glucose by meter        Disposition:   Admitted to the [facility] Diagnoses: None     Scribe Disclosure:  I, Twna Balbuena, am serving as a scribe at 12:54 PM on 2/1/2018 to document services personally performed by Solange Blevins MD, based on my observations and the provider's statements to me.   [facility] EMERGENCY DEPARTMENT       Solange Blevins MD  02/01/18 1023

## 2018-02-02 ENCOUNTER — OFFICE VISIT (OUTPATIENT)
Dept: INTERPRETER SERVICES | Facility: CLINIC | Age: 78
End: 2018-02-02
Payer: MEDICARE

## 2018-02-02 LAB
ANION GAP SERPL CALCULATED.3IONS-SCNC: 3 MMOL/L (ref 3–14)
ANION GAP SERPL CALCULATED.3IONS-SCNC: 5 MMOL/L (ref 3–14)
BUN SERPL-MCNC: 35 MG/DL (ref 7–30)
BUN SERPL-MCNC: 36 MG/DL (ref 7–30)
CALCIUM SERPL-MCNC: 8.4 MG/DL (ref 8.5–10.1)
CALCIUM SERPL-MCNC: 8.8 MG/DL (ref 8.5–10.1)
CHLORIDE SERPL-SCNC: 114 MMOL/L (ref 94–109)
CHLORIDE SERPL-SCNC: 115 MMOL/L (ref 94–109)
CO2 SERPL-SCNC: 29 MMOL/L (ref 20–32)
CO2 SERPL-SCNC: 31 MMOL/L (ref 20–32)
CREAT SERPL-MCNC: 1.09 MG/DL (ref 0.66–1.25)
CREAT SERPL-MCNC: 1.17 MG/DL (ref 0.66–1.25)
ERYTHROCYTE [DISTWIDTH] IN BLOOD BY AUTOMATED COUNT: 11.9 % (ref 10–15)
GFR SERPL CREATININE-BSD FRML MDRD: 61 ML/MIN/1.7M2
GFR SERPL CREATININE-BSD FRML MDRD: 66 ML/MIN/1.7M2
GLUCOSE BLDC GLUCOMTR-MCNC: 141 MG/DL (ref 70–99)
GLUCOSE BLDC GLUCOMTR-MCNC: 144 MG/DL (ref 70–99)
GLUCOSE BLDC GLUCOMTR-MCNC: 150 MG/DL (ref 70–99)
GLUCOSE BLDC GLUCOMTR-MCNC: 168 MG/DL (ref 70–99)
GLUCOSE BLDC GLUCOMTR-MCNC: 184 MG/DL (ref 70–99)
GLUCOSE BLDC GLUCOMTR-MCNC: 188 MG/DL (ref 70–99)
GLUCOSE BLDC GLUCOMTR-MCNC: 205 MG/DL (ref 70–99)
GLUCOSE BLDC GLUCOMTR-MCNC: 207 MG/DL (ref 70–99)
GLUCOSE BLDC GLUCOMTR-MCNC: 212 MG/DL (ref 70–99)
GLUCOSE BLDC GLUCOMTR-MCNC: 217 MG/DL (ref 70–99)
GLUCOSE BLDC GLUCOMTR-MCNC: 220 MG/DL (ref 70–99)
GLUCOSE BLDC GLUCOMTR-MCNC: 241 MG/DL (ref 70–99)
GLUCOSE BLDC GLUCOMTR-MCNC: 263 MG/DL (ref 70–99)
GLUCOSE BLDC GLUCOMTR-MCNC: 288 MG/DL (ref 70–99)
GLUCOSE SERPL-MCNC: 162 MG/DL (ref 70–99)
GLUCOSE SERPL-MCNC: 227 MG/DL (ref 70–99)
HCT VFR BLD AUTO: 39.4 % (ref 40–53)
HGB BLD-MCNC: 12.9 G/DL (ref 13.3–17.7)
INTERPRETATION ECG - MUSE: NORMAL
MAGNESIUM SERPL-MCNC: 2.5 MG/DL (ref 1.6–2.3)
MCH RBC QN AUTO: 30.8 PG (ref 26.5–33)
MCHC RBC AUTO-ENTMCNC: 32.7 G/DL (ref 31.5–36.5)
MCV RBC AUTO: 94 FL (ref 78–100)
PHOSPHATE SERPL-MCNC: 1.9 MG/DL (ref 2.5–4.5)
PHOSPHATE SERPL-MCNC: 1.9 MG/DL (ref 2.5–4.5)
PLATELET # BLD AUTO: 259 10E9/L (ref 150–450)
POTASSIUM SERPL-SCNC: 3.7 MMOL/L (ref 3.4–5.3)
POTASSIUM SERPL-SCNC: 4 MMOL/L (ref 3.4–5.3)
RBC # BLD AUTO: 4.19 10E12/L (ref 4.4–5.9)
SODIUM SERPL-SCNC: 148 MMOL/L (ref 133–144)
SODIUM SERPL-SCNC: 149 MMOL/L (ref 133–144)
WBC # BLD AUTO: 10.9 10E9/L (ref 4–11)

## 2018-02-02 PROCEDURE — 84100 ASSAY OF PHOSPHORUS: CPT | Performed by: INTERNAL MEDICINE

## 2018-02-02 PROCEDURE — 80048 BASIC METABOLIC PNL TOTAL CA: CPT | Performed by: INTERNAL MEDICINE

## 2018-02-02 PROCEDURE — 25000132 ZZH RX MED GY IP 250 OP 250 PS 637: Mod: GY | Performed by: INTERNAL MEDICINE

## 2018-02-02 PROCEDURE — 25000128 H RX IP 250 OP 636: Performed by: INTERNAL MEDICINE

## 2018-02-02 PROCEDURE — 27210995 ZZH RX 272: Performed by: INTERNAL MEDICINE

## 2018-02-02 PROCEDURE — 25000125 ZZHC RX 250: Performed by: INTERNAL MEDICINE

## 2018-02-02 PROCEDURE — 12000000 ZZH R&B MED SURG/OB

## 2018-02-02 PROCEDURE — 00000146 ZZHCL STATISTIC GLUCOSE BY METER IP

## 2018-02-02 PROCEDURE — T1013 SIGN LANG/ORAL INTERPRETER: HCPCS | Mod: U3

## 2018-02-02 PROCEDURE — 99233 SBSQ HOSP IP/OBS HIGH 50: CPT | Performed by: INTERNAL MEDICINE

## 2018-02-02 PROCEDURE — 83735 ASSAY OF MAGNESIUM: CPT | Performed by: INTERNAL MEDICINE

## 2018-02-02 PROCEDURE — 25000131 ZZH RX MED GY IP 250 OP 636 PS 637: Mod: GY | Performed by: INTERNAL MEDICINE

## 2018-02-02 PROCEDURE — 36415 COLL VENOUS BLD VENIPUNCTURE: CPT | Performed by: INTERNAL MEDICINE

## 2018-02-02 PROCEDURE — 85027 COMPLETE CBC AUTOMATED: CPT | Performed by: INTERNAL MEDICINE

## 2018-02-02 PROCEDURE — A9270 NON-COVERED ITEM OR SERVICE: HCPCS | Mod: GY | Performed by: INTERNAL MEDICINE

## 2018-02-02 RX ORDER — GLIMEPIRIDE 4 MG/1
4 TABLET ORAL
Status: DISCONTINUED | OUTPATIENT
Start: 2018-02-02 | End: 2018-02-02

## 2018-02-02 RX ORDER — LOSARTAN POTASSIUM AND HYDROCHLOROTHIAZIDE 12.5; 1 MG/1; MG/1
1 TABLET ORAL AT BEDTIME
Status: DISCONTINUED | OUTPATIENT
Start: 2018-02-02 | End: 2018-02-02 | Stop reason: RX

## 2018-02-02 RX ORDER — SODIUM CHLORIDE 450 MG/100ML
INJECTION, SOLUTION INTRAVENOUS CONTINUOUS
Status: DISCONTINUED | OUTPATIENT
Start: 2018-02-02 | End: 2018-02-04

## 2018-02-02 RX ORDER — NICOTINE POLACRILEX 4 MG
15-30 LOZENGE BUCCAL
Status: DISCONTINUED | OUTPATIENT
Start: 2018-02-02 | End: 2018-02-02

## 2018-02-02 RX ORDER — SIMVASTATIN 10 MG
10 TABLET ORAL AT BEDTIME
Status: DISCONTINUED | OUTPATIENT
Start: 2018-02-02 | End: 2018-02-05 | Stop reason: HOSPADM

## 2018-02-02 RX ORDER — HYDROCHLOROTHIAZIDE 12.5 MG/1
12.5 CAPSULE ORAL AT BEDTIME
Status: DISCONTINUED | OUTPATIENT
Start: 2018-02-02 | End: 2018-02-03

## 2018-02-02 RX ORDER — DEXTROSE MONOHYDRATE 25 G/50ML
25-50 INJECTION, SOLUTION INTRAVENOUS
Status: DISCONTINUED | OUTPATIENT
Start: 2018-02-02 | End: 2018-02-02

## 2018-02-02 RX ORDER — LOSARTAN POTASSIUM 100 MG/1
100 TABLET ORAL AT BEDTIME
Status: DISCONTINUED | OUTPATIENT
Start: 2018-02-02 | End: 2018-02-05 | Stop reason: HOSPADM

## 2018-02-02 RX ORDER — GLIMEPIRIDE 4 MG/1
8 TABLET ORAL
Status: DISCONTINUED | OUTPATIENT
Start: 2018-02-03 | End: 2018-02-03

## 2018-02-02 RX ADMIN — GLIMEPIRIDE 4 MG: 4 TABLET ORAL at 08:57

## 2018-02-02 RX ADMIN — INSULIN ASPART 1 UNITS: 100 INJECTION, SOLUTION INTRAVENOUS; SUBCUTANEOUS at 08:57

## 2018-02-02 RX ADMIN — METFORMIN HYDROCHLORIDE 500 MG: 500 TABLET ORAL at 17:07

## 2018-02-02 RX ADMIN — METFORMIN HYDROCHLORIDE 500 MG: 500 TABLET ORAL at 08:33

## 2018-02-02 RX ADMIN — HYDROCHLOROTHIAZIDE 12.5 MG: 12.5 CAPSULE ORAL at 21:25

## 2018-02-02 RX ADMIN — INSULIN ASPART 2 UNITS: 100 INJECTION, SOLUTION INTRAVENOUS; SUBCUTANEOUS at 11:59

## 2018-02-02 RX ADMIN — SODIUM CHLORIDE: 4.5 INJECTION, SOLUTION INTRAVENOUS at 16:05

## 2018-02-02 RX ADMIN — LOSARTAN POTASSIUM 100 MG: 100 TABLET ORAL at 21:25

## 2018-02-02 RX ADMIN — POTASSIUM PHOSPHATE, MONOBASIC AND POTASSIUM PHOSPHATE, DIBASIC 20 MMOL: 224; 236 INJECTION, SOLUTION, CONCENTRATE INTRAVENOUS at 21:37

## 2018-02-02 RX ADMIN — SIMVASTATIN 10 MG: 10 TABLET, FILM COATED ORAL at 21:26

## 2018-02-02 RX ADMIN — SODIUM CHLORIDE: 4.5 INJECTION, SOLUTION INTRAVENOUS at 08:33

## 2018-02-02 RX ADMIN — INSULIN ASPART 2 UNITS: 100 INJECTION, SOLUTION INTRAVENOUS; SUBCUTANEOUS at 17:07

## 2018-02-02 NOTE — PLAN OF CARE
Problem: Patient Care Overview  Goal: Plan of Care/Patient Progress Review  Outcome: Improving  ICU End of Shift Summary.  For vital signs and complete assessments, please see documentation flowsheets.     Pertinent assessments: AOX4, Tele SR, LS clear on RA, GI/ no n/v/d no BM noted, voids without difficulty, skin intact.  Major Shift Events: insulin drip d/c'd  Plan (Upcoming Events): monitor BG, DM teaching  Discharge/Transfer Needs: Transfer to room 605    Bedside Shift Report Completed : Report called receiving RN, all pt belongings with pt accompanied by spouse.  Bedside Safety Check Completed:

## 2018-02-02 NOTE — PROGRESS NOTES
Report received from Jose FOX. Pt arrived to room 605 at 1615 via wheelchair/cart with belongings. Rates pain 0/10. Oriented to room and call light. Given welcome packet by CRISTY, reviewed info with patient.

## 2018-02-02 NOTE — PLAN OF CARE
Problem: Diabetes, Type 2 (Adult)  Goal: Signs and Symptoms of Listed Potential Problems Will be Absent, Minimized or Managed (Diabetes, Type 2)  Signs and symptoms of listed potential problems will be absent, minimized or managed by discharge/transition of care (reference Diabetes, Type 2 (Adult) CPG).   Outcome: Improving  Insulin drip, SQ insulin/oral meds started.

## 2018-02-02 NOTE — PLAN OF CARE
Problem: Patient Care Overview  Goal: Plan of Care/Patient Progress Review  Outcome: Improving  ICU End of Shift Summary.  For vital signs and complete assessments, please see documentation flowsheets.     Pertinent assessments: AO. Denies pain. VSS. Lungs clear. 2L NC while sleeping. BG controlled with insulin gtt. Voiding in urinal. Wife at bedside.  Major Shift Events: none  Plan (Upcoming Events): TBD  Discharge/Transfer Needs: TBD    Bedside Shift Report Completed : y  Bedside Safety Check Completed[place]

## 2018-02-03 LAB
ALBUMIN SERPL-MCNC: 3 G/DL (ref 3.4–5)
ALP SERPL-CCNC: 91 U/L (ref 40–150)
ALT SERPL W P-5'-P-CCNC: 17 U/L (ref 0–70)
ANION GAP SERPL CALCULATED.3IONS-SCNC: 8 MMOL/L (ref 3–14)
AST SERPL W P-5'-P-CCNC: 25 U/L (ref 0–45)
BILIRUB SERPL-MCNC: 1 MG/DL (ref 0.2–1.3)
BUN SERPL-MCNC: 23 MG/DL (ref 7–30)
CALCIUM SERPL-MCNC: 8.4 MG/DL (ref 8.5–10.1)
CHLORIDE SERPL-SCNC: 109 MMOL/L (ref 94–109)
CO2 SERPL-SCNC: 25 MMOL/L (ref 20–32)
CREAT SERPL-MCNC: 0.88 MG/DL (ref 0.66–1.25)
ERYTHROCYTE [DISTWIDTH] IN BLOOD BY AUTOMATED COUNT: 11.7 % (ref 10–15)
GFR SERPL CREATININE-BSD FRML MDRD: 85 ML/MIN/1.7M2
GLUCOSE BLDC GLUCOMTR-MCNC: 102 MG/DL (ref 70–99)
GLUCOSE BLDC GLUCOMTR-MCNC: 143 MG/DL (ref 70–99)
GLUCOSE BLDC GLUCOMTR-MCNC: 159 MG/DL (ref 70–99)
GLUCOSE BLDC GLUCOMTR-MCNC: 218 MG/DL (ref 70–99)
GLUCOSE BLDC GLUCOMTR-MCNC: 220 MG/DL (ref 70–99)
GLUCOSE BLDC GLUCOMTR-MCNC: 318 MG/DL (ref 70–99)
GLUCOSE SERPL-MCNC: 168 MG/DL (ref 70–99)
HCT VFR BLD AUTO: 42.9 % (ref 40–53)
HGB BLD-MCNC: 13.9 G/DL (ref 13.3–17.7)
MAGNESIUM SERPL-MCNC: 2.4 MG/DL (ref 1.6–2.3)
MCH RBC QN AUTO: 31 PG (ref 26.5–33)
MCHC RBC AUTO-ENTMCNC: 32.4 G/DL (ref 31.5–36.5)
MCV RBC AUTO: 96 FL (ref 78–100)
PHOSPHATE SERPL-MCNC: 2.6 MG/DL (ref 2.5–4.5)
PLATELET # BLD AUTO: 234 10E9/L (ref 150–450)
POTASSIUM SERPL-SCNC: 3.8 MMOL/L (ref 3.4–5.3)
PROT SERPL-MCNC: 6.9 G/DL (ref 6.8–8.8)
RBC # BLD AUTO: 4.49 10E12/L (ref 4.4–5.9)
SODIUM SERPL-SCNC: 142 MMOL/L (ref 133–144)
WBC # BLD AUTO: 10.9 10E9/L (ref 4–11)

## 2018-02-03 PROCEDURE — 80053 COMPREHEN METABOLIC PANEL: CPT | Performed by: INTERNAL MEDICINE

## 2018-02-03 PROCEDURE — 99233 SBSQ HOSP IP/OBS HIGH 50: CPT | Performed by: INTERNAL MEDICINE

## 2018-02-03 PROCEDURE — 36415 COLL VENOUS BLD VENIPUNCTURE: CPT | Performed by: INTERNAL MEDICINE

## 2018-02-03 PROCEDURE — 25000132 ZZH RX MED GY IP 250 OP 250 PS 637: Mod: GY | Performed by: INTERNAL MEDICINE

## 2018-02-03 PROCEDURE — 83735 ASSAY OF MAGNESIUM: CPT | Performed by: INTERNAL MEDICINE

## 2018-02-03 PROCEDURE — 25000131 ZZH RX MED GY IP 250 OP 636 PS 637: Mod: GY | Performed by: INTERNAL MEDICINE

## 2018-02-03 PROCEDURE — 99207 ZZC APP CREDIT; MD BILLING SHARED VISIT: CPT | Performed by: INTERNAL MEDICINE

## 2018-02-03 PROCEDURE — 27210995 ZZH RX 272: Performed by: INTERNAL MEDICINE

## 2018-02-03 PROCEDURE — 00000146 ZZHCL STATISTIC GLUCOSE BY METER IP

## 2018-02-03 PROCEDURE — A9270 NON-COVERED ITEM OR SERVICE: HCPCS | Mod: GY | Performed by: INTERNAL MEDICINE

## 2018-02-03 PROCEDURE — 85027 COMPLETE CBC AUTOMATED: CPT | Performed by: INTERNAL MEDICINE

## 2018-02-03 PROCEDURE — 12000000 ZZH R&B MED SURG/OB

## 2018-02-03 PROCEDURE — 84100 ASSAY OF PHOSPHORUS: CPT | Performed by: INTERNAL MEDICINE

## 2018-02-03 RX ORDER — NICOTINE POLACRILEX 4 MG
15-30 LOZENGE BUCCAL
Status: DISCONTINUED | OUTPATIENT
Start: 2018-02-03 | End: 2018-02-03

## 2018-02-03 RX ORDER — DEXTROSE MONOHYDRATE 25 G/50ML
25-50 INJECTION, SOLUTION INTRAVENOUS
Status: DISCONTINUED | OUTPATIENT
Start: 2018-02-03 | End: 2018-02-03

## 2018-02-03 RX ADMIN — SIMVASTATIN 10 MG: 10 TABLET, FILM COATED ORAL at 21:04

## 2018-02-03 RX ADMIN — INSULIN GLARGINE 16 UNITS: 100 INJECTION, SOLUTION SUBCUTANEOUS at 08:55

## 2018-02-03 RX ADMIN — METFORMIN HYDROCHLORIDE 500 MG: 500 TABLET ORAL at 17:12

## 2018-02-03 RX ADMIN — LOSARTAN POTASSIUM 100 MG: 100 TABLET ORAL at 21:04

## 2018-02-03 RX ADMIN — SODIUM CHLORIDE: 4.5 INJECTION, SOLUTION INTRAVENOUS at 10:00

## 2018-02-03 RX ADMIN — METFORMIN HYDROCHLORIDE 500 MG: 500 TABLET ORAL at 08:55

## 2018-02-03 RX ADMIN — SODIUM CHLORIDE: 4.5 INJECTION, SOLUTION INTRAVENOUS at 21:05

## 2018-02-03 RX ADMIN — INSULIN GLARGINE 10 UNITS: 100 INJECTION, SOLUTION SUBCUTANEOUS at 15:00

## 2018-02-03 NOTE — CONSULTS
CM saw patient & family for new diagnosis of DM. , Jesus, interpreted for CM. Provided patient with documents for keeping track of his blood glucose levels, information about diabetes, Calorie Michael CHO counting book. Discussed with granddaughter and pt (through ).     Discussed w/pt and family that a nutritionist would be seeing them to discuss foods and pharmacist would be seeing them to go over insulin administration. No additional questions from pt or family.     CM will continue to follow patient until discharge for any additional needs.     Anjelica Oliver RN, BSN, CTS  [facility]  [phone]

## 2018-02-03 NOTE — PROVIDER NOTIFICATION
Web based paged hospitalist  blood glucose at 0200 is 318.  currently infusing potassium phosphate d5W per protocol.  Do you want me to give any insulin to cover this blood glucose

## 2018-02-03 NOTE — PLAN OF CARE
Problem: Patient Care Overview  Goal: Plan of Care/Patient Progress Review  Outcome: Improving  Pt is voiding and tolerating diet. Pt was given his literature on diabetes with .  pts spouse stated that he had 3 loose stools this shift.  We will get a stool sample with next BM.

## 2018-02-03 NOTE — CONSULTS
"CLINICAL NUTRITION SERVICES  -  ASSESSMENT NOTE      Malnutrition: Does not meet criteria at this time        REASON FOR ASSESSMENT  Godfrey Varma is a 77 year old male seen by Registered Dietitian for Provider Order - Nutrition Education - \"diabetic education\".       NUTRITION HISTORY  - Information obtained from patient and family using professional interpretor - Cambodian.  - New DMII dx.    - Regular diet PTA.    - Consumes meals TID.   - Meals often contain rice with meat + vegetables or soup with meat + vegetables.   - No changes to meal frequency of portion sizes PTA.   - NKFA.      CURRENT NUTRITION ORDERS  Diet Order:     Mod CHO    Current Intake/Tolerance:  75% meal consumption since admission.  Eating lunch while in room, good appetite/tolerance.      PHYSICAL FINDINGS  Observed  No e/o overt fat or muscle loss, physical exam limited  Obtained from Chart/Interdisciplinary Team  No nutritionally pertinent     ANTHROPOMETRICS  Height: 5' 7\" (per care everywhere)  Weight: 73.6 kg (162#)  Body Mass Index: 25 kg/m^2  Weight Status:  Overweight BMI 25-29.9  IBW: 67.3 kg (148#)  % IBW: 109%  Weight History:  Wt Readings from Last 10 Encounters:   02/01/18 73.7 kg (162 lb 7.7 oz)   - Denies recent wt changes including wt loss.    LABS  Labs reviewed:  - HgbA1C from 2/1 12.5%    MEDICATIONS  Medications reviewed:  - 5 units Novolog with meals TID, SSI, 16 units Lantus in AM, 500 mg Metformin BID    Dosing Weight 73.6 kg - most recent wt    ASSESSED NUTRITION NEEDS PER APPROVED PRACTICE GUIDELINES:  Estimated Energy Needs: 2204-1644 kcals (25-30 Kcal/Kg)  Justification: maintenance  Estimated Protein Needs: 74-88 grams protein (1-1.2 g pro/Kg)  Justification: maintenance  Estimated Fluid Needs: 1239-4493 mL (1 mL/Kcal)  Justification: maintenance and per provider pending fluid status    MALNUTRITION:  % Weight Loss:  None noted  % Intake:  No decreased intake noted  Subcutaneous Fat Loss:  None observed  Muscle Loss:  " "None observed  Fluid Retention:  None noted    Malnutrition Diagnosis: Patient does not meet two of the above criteria necessary for diagnosing malnutrition    NUTRITION DIAGNOSIS:  Food and nutrition-related knowledge deficit related to lack of exposure to nutrition education with new dx as evidenced by HgbA1C of 12.5% with regular diet at baseline.      NUTRITION INTERVENTIONS  Recommendations / Nutrition Prescription  Continue diabetic diet at d/c.      Implementation  Nutrition education: Provided education on DMII nutrition therapy:    Assessed learning needs, learning preferences, and willingness to learn    Nutrition Education (Content):  a) Provided handout \"DMII Nutrition Therapy\".  b) Discussed foods which contain CHO, the importance of portion control and limiting added sugars, g CHO recommended at meals/snacks  c) Discussed MyPlate method and how to read Nutrition Facts Labels.    Nutrition Education (Application):  a) Discussed eating habits and recommended alternative food choices  b) Encouraged monitoring of CHO intake for at least 1 week at d/c.    Patient verbalizes understanding of diet by naming 3 foods which contain CHO.    Anticipate fair to good compliance.    Diet Education - refer to Education Flowsheet.    Collaboration and Referral of Nutrition care: Discussed education with MD.      Nutrition Goals  Patient to verbalize g CHO recommended at meals and/or snacks.      MONITORING AND EVALUATION:  Progress towards goals will be monitored and evaluated per protocol and Practice Guidelines        Celestina Healy RD, LD  Clinical Dietitian  3rd floor/ICU: [phone]  All other floors: [phone]  Weekend/holiday: [phone]  "

## 2018-02-03 NOTE — PROGRESS NOTES
[facility]  Hospitalist Progress Note  Junior Gonzalez MD 02/03/2018    Reason for Stay (Diagnosis): New dx DM with severe hyperglycemia and ketosis.          Assessment and Plan:      Summary of Stay: Mr. Varma is a very pleasant 77-year-old Cambodian man who came to attention on 2/1/18 with weakness and urinary frequency.  In the emergency department he was found to be significantly hyperglycemic and at least moderately dehydrated.  His glucose values were above the measurable threshold, anion gap was normal but he did have urinary ketones.     Notably the patient's prior medical history is negative for diagnosis of diabetes though he has been monitored in the past for hyperglycemia.  He has dyslipidemia and hypertension but otherwise is a quite healthy man.    Problem List:   1. Severe hyperglycemia with keratosis.  Newly diagnosed diabetes mellitus type 2  -There was no metabolic derangement, no anion gap, bicarb was normal.  PH is slightly low but that is due to respiratory acidosis and metabolic.  -He was hydrated with IV fluids, was on insulin drip after admission to the ICU.  This was changed to oral hypoglycemic agent when transferred to the floor.   -Glucose started to increase early this morning.  -Lantus started, increased to 25 units per day  -Pre-meal insulin NovoLog 8 units 3 times daily started.  -Continue metformin, stopped glimepiride.  -This patient has hemoglobin A1c of 12.5, which is unlikely to be corrected by oral hypoglycemic agent  -He is advised to go on insulin upon discharge at least for the next 3-6 months.  He needs to be reevaluated again for his insulin requirement and hemoglobin A1c level.  -Nutrition consulted, diabetic teaching.  -Carb controlled diet, activity as tolerated    2. HTN-controlled.     3. Dyslipidemia-continue Zocor.     DVT Prophylaxis: Pneumatic Compression Devices  Code Status: Full Code  Discharge Dispo: home expected  Estimated Disch Date / #  of Days until Disch: likely 1-2 days in the hospital, pending optimal glucose control on insulin.  I had long discussion with patient and she has a family member at bedside, regarding diabetes oral diabetic medications and also about insulin.  Given his hemoglobin A1c advised to go home on insulin and patient agreed.  All their questions and concerns addressed and showed understanding.        Interval History (Subjective):      Patient seen and examined, family at bedside, used to professional Cambodian .  He feels better, tolerated oral intake, no nausea or vomiting.  His glucose is not optimally controlled started on insulin discussed with patient at length agreed to go home on insulin.                    Physical Exam:      Last Vital Signs:  /69  Pulse 108  Temp 97.9  F (36.6  C) (Oral)  Resp 16  Wt 73.7 kg (162 lb 7.7 oz)  SpO2 98%    Constitutional: Awake, alert, cooperative, no apparent distress   Respiratory: Clear to auscultation bilaterally, no crackles or wheezing   Cardiovascular: Regular rate and rhythm, normal S1 and S2, and no murmur noted   Abdomen: Normal bowel sounds, soft, non-distended, non-tender   Skin: No rashes, no cyanosis, dry to touch   Neuro: Alert and oriented x3, no weakness, numbness, memory loss   Extremities: No edema, normal range of motion   Other(s):        All other systems: Negative          Medications:      All current medications were reviewed with changes reflected in problem list.         Data:      All new lab and imaging data was reviewed.   Labs    Recent Labs  Lab 02/03/18  0759 02/02/18  0513 02/01/18  1358   WBC 10.9 10.9 12.7*   HGB 13.9 12.9* 14.7   HCT 42.9 39.4* 44.7   MCV 96 94 94    259 262       Recent Labs  Lab 02/03/18  0759 02/02/18  0513 02/01/18  2356    148* 149*   POTASSIUM 3.8 4.0 3.7   CHLORIDE 109 114* 115*   CO2 25 31 29   ANIONGAP 8 3 5   * 227* 162*   BUN 23 35* 36*   CR 0.88 1.17 1.09   GFRESTIMATED 85 61  66   GFRESTBLACK >90 74 80   JOE 8.4* 8.4* 8.8       Recent Labs  Lab 02/03/18  1135 02/03/18  0759 02/03/18  0741 02/03/18  0425 02/03/18  0135 02/02/18  2118  02/02/18  0513  02/01/18  2356  02/01/18  1949  02/01/18  1604   GLC  --  168*  --   --   --   --   --  227*  --  162*  --  423*  --  673*   *  --  159* 218* 318* 263*  < >  --   < >  --   < >  --   < >  --    < > = values in this interval not displayed.

## 2018-02-03 NOTE — PLAN OF CARE
Problem: Patient Care Overview  Goal: Plan of Care/Patient Progress Review  A/O, VSS,  phosphourus 1.9 replaced per proctocol recheck in the morning.  blood glucose 318 md notified.  Lantus dose statred for morning.  high dose sliding scale ordered and a one time dose given.  Recheck 218 Coumdian speaking intrpreter schduled for 0800.  wife in room.

## 2018-02-03 NOTE — PROGRESS NOTES
[facility]  Hospitalist Progress Note  Morales Chung MD 02/02/2018    Reason for Stay (Diagnosis): New dx DM with severe hyperglycemia and ketosis.          Assessment and Plan:      Summary of Stay: Mr. Varma is a very pleasant 77-year-old Camb[place]ian man who came to attention on 2/1/18 with weakness and urinary frequency.  In the emergency department he was found to be significantly hyperglycemic and at least moderately dehydrated.  His glucose values were above the measurable threshold, anion gap was normal but he did have urinary ketones.     Notably the patient's prior medical history is negative for diagnosis of diabetes though he has been monitored in the past for hyperglycemia.  He has dyslipidemia and hypertension but otherwise is a quite healthy man.    Problem List:   1. Severe hyperglycemia with mild DKA. Dehydration corrected and pt feeling better. Not hungry yet.   2. ZAY. Corrected virtually to baseline.   3. HTN.   4. Dyslipidemia.     PLAN:  1. Stopped insulin drip and glucose infusion.   2. Amaryl and Metformin started. Consider switch to Insulin.   3. Resume home meds.   4. DM Education.     DVT Prophylaxis: Pneumatic Compression Devices  Code Status: Full Code  Discharge Dispo: home expected  Estimated Disch Date / # of Days until Disch: likely 1-2 more days.    Transfer to medical floor.         Interval History (Subjective):      Doing better. Still quite easily nauseated when he eats. Urinating less. Abd still mildly distended.     I met with the patient at the bedside for more than 15 minutes with .   I spent some time trying to start education for DM. Discussed need for eye exams and reasonable glucose control. Including specific glucose goals.                   Physical Exam:      Last Vital Signs:  /77 (BP Location: Right arm)  Pulse 108  Temp 95.7  F (35.4  C) (Oral)  Resp 16  Wt 73.7 kg (162 lb 7.7 oz)  SpO2 98%    I/O last 3 completed shifts:  In:  3079.54 [P.O.:360; I.V.:2719.54]  Out: 600 [Urine:600]    Constitutional: Awake, alert, cooperative, no apparent distress   Respiratory: Clear to auscultation bilaterally, no crackles or wheezing   Cardiovascular: Regular rate and rhythm, normal S1 and S2, and no murmur noted   Abdomen: Normal bowel sounds, soft, non-distended, non-tender   Skin: No rashes, no cyanosis, dry to touch   Neuro: Alert and oriented x3, no weakness, numbness, memory loss   Extremities: No edema, normal range of motion   Other(s):        All other systems: Negative          Medications:      All current medications were reviewed with changes reflected in problem list.         Data:      All new lab and imaging data was reviewed.   Labs/Imaging:  Results for orders placed or performed during the hospital encounter of 02/01/18 (from the past 24 hour(s))   Glucose by meter   Result Value Ref Range    Glucose 282 (H) 70 - 99 mg/dL   Glucose by meter   Result Value Ref Range    Glucose 202 (H) 70 - 99 mg/dL   Glucose by meter   Result Value Ref Range    Glucose 150 (H) 70 - 99 mg/dL   Basic metabolic panel   Result Value Ref Range    Sodium 149 (H) 133 - 144 mmol/L    Potassium 3.7 3.4 - 5.3 mmol/L    Chloride 115 (H) 94 - 109 mmol/L    Carbon Dioxide 29 20 - 32 mmol/L    Anion Gap 5 3 - 14 mmol/L    Glucose 162 (H) 70 - 99 mg/dL    Urea Nitrogen 36 (H) 7 - 30 mg/dL    Creatinine 1.09 0.66 - 1.25 mg/dL    GFR Estimate 66 >60 mL/min/1.7m2    GFR Estimate If Black 80 >60 mL/min/1.7m2    Calcium 8.8 8.5 - 10.1 mg/dL   Glucose by meter   Result Value Ref Range    Glucose 141 (H) 70 - 99 mg/dL   Glucose by meter   Result Value Ref Range    Glucose 144 (H) 70 - 99 mg/dL   Glucose by meter   Result Value Ref Range    Glucose 168 (H) 70 - 99 mg/dL   Glucose by meter   Result Value Ref Range    Glucose 188 (H) 70 - 99 mg/dL   Glucose by meter   Result Value Ref Range    Glucose 212 (H) 70 - 99 mg/dL   Basic metabolic panel   Result Value Ref Range     Sodium 148 (H) 133 - 144 mmol/L    Potassium 4.0 3.4 - 5.3 mmol/L    Chloride 114 (H) 94 - 109 mmol/L    Carbon Dioxide 31 20 - 32 mmol/L    Anion Gap 3 3 - 14 mmol/L    Glucose 227 (H) 70 - 99 mg/dL    Urea Nitrogen 35 (H) 7 - 30 mg/dL    Creatinine 1.17 0.66 - 1.25 mg/dL    GFR Estimate 61 >60 mL/min/1.7m2    GFR Estimate If Black 74 >60 mL/min/1.7m2    Calcium 8.4 (L) 8.5 - 10.1 mg/dL   Magnesium   Result Value Ref Range    Magnesium 2.5 (H) 1.6 - 2.3 mg/dL   Phosphorus   Result Value Ref Range    Phosphorus 1.9 (L) 2.5 - 4.5 mg/dL   CBC with platelets   Result Value Ref Range    WBC 10.9 4.0 - 11.0 10e9/L    RBC Count 4.19 (L) 4.4 - 5.9 10e12/L    Hemoglobin 12.9 (L) 13.3 - 17.7 g/dL    Hematocrit 39.4 (L) 40.0 - 53.0 %    MCV 94 78 - 100 fl    MCH 30.8 26.5 - 33.0 pg    MCHC 32.7 31.5 - 36.5 g/dL    RDW 11.9 10.0 - 15.0 %    Platelet Count 259 150 - 450 10e9/L   Glucose by meter   Result Value Ref Range    Glucose 217 (H) 70 - 99 mg/dL   Glucose by meter   Result Value Ref Range    Glucose 205 (H) 70 - 99 mg/dL   Glucose by meter   Result Value Ref Range    Glucose 184 (H) 70 - 99 mg/dL   Glucose by meter   Result Value Ref Range    Glucose 288 (H) 70 - 99 mg/dL   Glucose by meter   Result Value Ref Range    Glucose 220 (H) 70 - 99 mg/dL   Glucose by meter   Result Value Ref Range    Glucose 241 (H) 70 - 99 mg/dL   Glucose by meter   Result Value Ref Range    Glucose 207 (H) 70 - 99 mg/dL   Phosphorus   Result Value Ref Range    Phosphorus 1.9 (L) 2.5 - 4.5 mg/dL

## 2018-02-03 NOTE — PROGRESS NOTES
CROSS COVER  Called about marked hyperglycemia glucose 318  Came off insulin drip today  And had admission glucose over 600 and A1C 12.5  And was not started on any long acting insulin (?)    Will give novolog 14 units subq now  And start lantus 16 units daily first dose at 6 am  And increase sliding scale to high insulin dosing

## 2018-02-04 LAB
C DIFF TOX B STL QL: NEGATIVE
GLUCOSE BLDC GLUCOMTR-MCNC: 105 MG/DL (ref 70–99)
GLUCOSE BLDC GLUCOMTR-MCNC: 132 MG/DL (ref 70–99)
GLUCOSE BLDC GLUCOMTR-MCNC: 187 MG/DL (ref 70–99)
GLUCOSE BLDC GLUCOMTR-MCNC: 188 MG/DL (ref 70–99)
GLUCOSE BLDC GLUCOMTR-MCNC: 97 MG/DL (ref 70–99)
MAGNESIUM SERPL-MCNC: 1.7 MG/DL (ref 1.6–2.3)
PHOSPHATE SERPL-MCNC: 3.3 MG/DL (ref 2.5–4.5)
POTASSIUM SERPL-SCNC: 3.4 MMOL/L (ref 3.4–5.3)
SPECIMEN SOURCE: NORMAL

## 2018-02-04 PROCEDURE — 84132 ASSAY OF SERUM POTASSIUM: CPT | Performed by: INTERNAL MEDICINE

## 2018-02-04 PROCEDURE — 99232 SBSQ HOSP IP/OBS MODERATE 35: CPT | Performed by: INTERNAL MEDICINE

## 2018-02-04 PROCEDURE — 87493 C DIFF AMPLIFIED PROBE: CPT | Performed by: INTERNAL MEDICINE

## 2018-02-04 PROCEDURE — 25000131 ZZH RX MED GY IP 250 OP 636 PS 637: Mod: GY | Performed by: INTERNAL MEDICINE

## 2018-02-04 PROCEDURE — 12000000 ZZH R&B MED SURG/OB

## 2018-02-04 PROCEDURE — 25000132 ZZH RX MED GY IP 250 OP 250 PS 637: Mod: GY | Performed by: INTERNAL MEDICINE

## 2018-02-04 PROCEDURE — 00000146 ZZHCL STATISTIC GLUCOSE BY METER IP

## 2018-02-04 PROCEDURE — 36415 COLL VENOUS BLD VENIPUNCTURE: CPT | Performed by: INTERNAL MEDICINE

## 2018-02-04 PROCEDURE — 83735 ASSAY OF MAGNESIUM: CPT | Performed by: INTERNAL MEDICINE

## 2018-02-04 PROCEDURE — A9270 NON-COVERED ITEM OR SERVICE: HCPCS | Mod: GY | Performed by: INTERNAL MEDICINE

## 2018-02-04 PROCEDURE — 84100 ASSAY OF PHOSPHORUS: CPT | Performed by: INTERNAL MEDICINE

## 2018-02-04 RX ADMIN — SIMVASTATIN 10 MG: 10 TABLET, FILM COATED ORAL at 21:04

## 2018-02-04 RX ADMIN — METFORMIN HYDROCHLORIDE 500 MG: 500 TABLET ORAL at 07:58

## 2018-02-04 RX ADMIN — LOSARTAN POTASSIUM 100 MG: 100 TABLET ORAL at 21:04

## 2018-02-04 RX ADMIN — INSULIN GLARGINE 25 UNITS: 100 INJECTION, SOLUTION SUBCUTANEOUS at 07:57

## 2018-02-04 NOTE — PLAN OF CARE
Problem: Patient Care Overview  Goal: Plan of Care/Patient Progress Review  cambodian speaking wife in room  scheduled to come in today.  vss loose stool sample sent to test for cdiff on enteric precautions blood glucose this shift was 132.

## 2018-02-04 NOTE — PLAN OF CARE
Problem: Patient Care Overview  Goal: Plan of Care/Patient Progress Review  Outcome: Improving  Pt is up independently in room. Family at bedside. Pt had loose stools yesterday and this am.  Stool sample sent and cdiff was neg.

## 2018-02-04 NOTE — PLAN OF CARE
Problem: Patient Care Overview  Goal: Plan of Care/Patient Progress Review  Outcome: Improving  Vital signs stable.  Lungs clear.  Bowel sounds hypoactive, no stool, voiding.  Tolerated diet, no nausea.Up independently in room, assisted by spouse with adl's.  Verbalized needs, denies pain.Blood glucose monitoring..

## 2018-02-04 NOTE — PROGRESS NOTES
[facility]  Hospitalist Progress Note  Junior Gonzalez MD 02/04/2018    Reason for Stay (Diagnosis): New dx DM with severe hyperglycemia and ketosis.          Assessment and Plan:      Summary of Stay: Mr. Varma is a very pleasant 77-year-old Camb[place]ian man who came to attention on 2/1/18 with weakness and urinary frequency.  In the emergency department he was found to be significantly hyperglycemic and at least moderately dehydrated.  His glucose values were above the measurable threshold, anion gap was normal but he did have urinary ketones.     Notably the patient's prior medical history is negative for diagnosis of diabetes though he has been monitored in the past for hyperglycemia.  He has dyslipidemia and hypertension but otherwise is a quite healthy man.    Problem List:   1. Severe hyperglycemia with keratosis.  Newly diagnosed diabetes mellitus type 2.  His glucose was over 700 on presentation.  -There was no metabolic derangement, no anion gap, bicarb was normal on admission.  PH was slightly low but that is due to respiratory acidosis and metabolic.  -He was hydrated with IV fluids, was on insulin drip after admission to the ICU.  This was changed to oral hypoglycemic agent when transferred to the floor but glucose started to go up and subcutaneous insulin initiated.   -Lantus started, increased to 25 units per day  -Pre-meal insulin NovoLog decreased from 8 units to 5 units 3 times daily.  -Stop metformin dued to diarrhea.  -This patient has hemoglobin A1c of 12.5, which is unlikely to be corrected by oral hypoglycemic agent  -He is advised to go on insulin upon discharge at least for the next 3-6 months.  He needs to be reevaluated again for his insulin requirement and hemoglobin A1c level.  If his insulin requirement decreases and hemoglobin A1c also decreased, may consider oral agents at that time  -Nutrition consulted, diabetic teaching.  -Carb controlled diet, activity as  tolerated    2. HTN-controlled.     3. Dyslipidemia-continue Zocor.      4. Diarrhea-could be due to metformin, stools for C. difficile sent results pending.  Stop metformin as it can cause diarrhea.  -Continue IV fluid for now, will check BMP and CBC in the morning      DVT Prophylaxis: Pneumatic Compression Devices.    Code Status: Full Code.  Discharge Dispo: home expected.  Estimated Disch Date / # of Days until Disch: Likely 1 day in the hospital, needs to adjust his insulin based on his glucose level and insulin requirement.    I had long discussion with patient and she has a family member at bedside again today, regarding diabetes oral diabetic medications and also about insulin.    All their questions and concerns addressed and showed understanding.        Interval History (Subjective):      Patient seen and examined, family at bedside, used a professional Camb[place]ian .  He feels better, tolerated oral intake, no nausea or vomiting. He has diarrhea about 3 times overnight. His glucose is better controlled on insulin.                    Physical Exam:      Last Vital Signs:  /79  Pulse 108  Temp 97.3  F (36.3  C) (Oral)  Resp 16  Wt 73.7 kg (162 lb 7.7 oz)  SpO2 95%    Constitutional: Awake, alert, cooperative, no apparent distress   Respiratory: Clear to auscultation bilaterally, no crackles or wheezing   Cardiovascular: Regular rate and rhythm, normal S1 and S2, and no murmur noted   Abdomen: Normal bowel sounds, soft, non-distended, non-tender   Skin: No rashes, no cyanosis, dry to touch   Neuro: Alert and oriented x3, no weakness, numbness, memory loss   Extremities: No edema, normal range of motion   Other(s):        All other systems: Negative          Medications:        Current Facility-Administered Medications   Medication     insulin aspart (NovoLOG) inj (RAPID ACTING)     insulin aspart (NovoLOG) inj (RAPID ACTING)     insulin aspart (NovoLOG) inj (RAPID ACTING)     insulin  glargine (LANTUS) injection 25 Units     metFORMIN (GLUCOPHAGE) tablet 500 mg     sodium chloride 0.45% infusion     simvastatin (ZOCOR) tablet 10 mg     losartan (COZAAR) tablet 100 mg     glucose 40 % gel 15-30 g    Or     dextrose 50 % injection 25-50 mL    Or     glucagon injection 1 mg     lidocaine 1 % 1 mL     lidocaine (LMX4) kit     sodium chloride (PF) 0.9% PF flush 3 mL     sodium chloride (PF) 0.9% PF flush 3 mL     nitroGLYcerin (NITROSTAT) sublingual tablet 0.4 mg     potassium chloride SA (K-DUR/KLOR-CON M) CR tablet 20-40 mEq     potassium chloride (KLOR-CON) Packet 20-40 mEq     potassium chloride 10 mEq in 100 mL sterile water intermittent infusion (premix)     potassium chloride 10 mEq in 100 mL intermittent infusion with 10 mg lidocaine     potassium chloride 20 mEq in 50 mL intermittent infusion     magnesium sulfate 4 g in 100 mL sterile water (premade)     potassium phosphate 15 mmol in D5W 250 mL intermittent infusion     potassium phosphate 20 mmol in D5W 500 mL intermittent infusion     potassium phosphate 20 mmol in D5W 250 mL intermittent infusion     potassium phosphate 25 mmol in D5W 500 mL intermittent infusion              Data:      All new lab and imaging data was reviewed.   Labs    Recent Labs  Lab 02/03/18  0759 02/02/18  0513 02/01/18  1358   WBC 10.9 10.9 12.7*   HGB 13.9 12.9* 14.7   HCT 42.9 39.4* 44.7   MCV 96 94 94    259 262       Recent Labs  Lab 02/04/18  0608 02/03/18  0759 02/02/18  0513 02/01/18  2356   NA  --  142 148* 149*   POTASSIUM 3.4 3.8 4.0 3.7   CHLORIDE  --  109 114* 115*   CO2  --  25 31 29   ANIONGAP  --  8 3 5   GLC  --  168* 227* 162*   BUN  --  23 35* 36*   CR  --  0.88 1.17 1.09   GFRESTIMATED  --  85 61 66   GFRESTBLACK  --  >90 74 80   JOE  --  8.4* 8.4* 8.8       Recent Labs  Lab 02/04/18  0728 02/04/18  0134 02/03/18  2145 02/03/18  1643 02/03/18  1135 02/03/18  0759  02/02/18  0513  02/01/18  2356  02/01/18  1949  02/01/18  1604   GLC  --    --   --   --   --  168*  --  227*  --  162*  --  423*  --  673*   * 132* 143* 102* 220*  --   < >  --   < >  --   < >  --   < >  --    < > = values in this interval not displayed.

## 2018-02-05 ENCOUNTER — OFFICE VISIT (OUTPATIENT)
Dept: INTERPRETER SERVICES | Facility: CLINIC | Age: 78
End: 2018-02-05
Payer: MEDICARE

## 2018-02-05 VITALS
DIASTOLIC BLOOD PRESSURE: 67 MMHG | OXYGEN SATURATION: 94 % | WEIGHT: 162.48 LBS | HEART RATE: 108 BPM | SYSTOLIC BLOOD PRESSURE: 129 MMHG | RESPIRATION RATE: 16 BRPM | TEMPERATURE: 96.2 F

## 2018-02-05 LAB
ANION GAP SERPL CALCULATED.3IONS-SCNC: 6 MMOL/L (ref 3–14)
BUN SERPL-MCNC: 17 MG/DL (ref 7–30)
CALCIUM SERPL-MCNC: 8.3 MG/DL (ref 8.5–10.1)
CHLORIDE SERPL-SCNC: 107 MMOL/L (ref 94–109)
CO2 SERPL-SCNC: 28 MMOL/L (ref 20–32)
CREAT SERPL-MCNC: 0.89 MG/DL (ref 0.66–1.25)
ERYTHROCYTE [DISTWIDTH] IN BLOOD BY AUTOMATED COUNT: 11.5 % (ref 10–15)
GFR SERPL CREATININE-BSD FRML MDRD: 83 ML/MIN/1.7M2
GLUCOSE BLDC GLUCOMTR-MCNC: 100 MG/DL (ref 70–99)
GLUCOSE BLDC GLUCOMTR-MCNC: 169 MG/DL (ref 70–99)
GLUCOSE SERPL-MCNC: 113 MG/DL (ref 70–99)
HCT VFR BLD AUTO: 35.4 % (ref 40–53)
HGB BLD-MCNC: 11.8 G/DL (ref 13.3–17.7)
MCH RBC QN AUTO: 30.8 PG (ref 26.5–33)
MCHC RBC AUTO-ENTMCNC: 33.3 G/DL (ref 31.5–36.5)
MCV RBC AUTO: 92 FL (ref 78–100)
PLATELET # BLD AUTO: 190 10E9/L (ref 150–450)
POTASSIUM SERPL-SCNC: 3.5 MMOL/L (ref 3.4–5.3)
RBC # BLD AUTO: 3.83 10E12/L (ref 4.4–5.9)
SODIUM SERPL-SCNC: 141 MMOL/L (ref 133–144)
WBC # BLD AUTO: 7.9 10E9/L (ref 4–11)

## 2018-02-05 PROCEDURE — 00000146 ZZHCL STATISTIC GLUCOSE BY METER IP

## 2018-02-05 PROCEDURE — T1013 SIGN LANG/ORAL INTERPRETER: HCPCS | Mod: U3

## 2018-02-05 PROCEDURE — 99239 HOSP IP/OBS DSCHRG MGMT >30: CPT | Performed by: INTERNAL MEDICINE

## 2018-02-05 PROCEDURE — 85027 COMPLETE CBC AUTOMATED: CPT | Performed by: INTERNAL MEDICINE

## 2018-02-05 PROCEDURE — 80048 BASIC METABOLIC PNL TOTAL CA: CPT | Performed by: INTERNAL MEDICINE

## 2018-02-05 PROCEDURE — 36415 COLL VENOUS BLD VENIPUNCTURE: CPT | Performed by: INTERNAL MEDICINE

## 2018-02-05 PROCEDURE — 25000131 ZZH RX MED GY IP 250 OP 636 PS 637: Mod: GY | Performed by: INTERNAL MEDICINE

## 2018-02-05 RX ORDER — PEN NEEDLE, DIABETIC 31 GX5/16"
1 NEEDLE, DISPOSABLE MISCELLANEOUS 4 TIMES DAILY
Qty: 200 EACH | Refills: 1 | Status: SHIPPED | OUTPATIENT
Start: 2018-02-05 | End: 2018-02-05

## 2018-02-05 RX ORDER — BLOOD-GLUCOSE METER
EACH MISCELLANEOUS
Qty: 1 KIT | Refills: 0 | Status: SHIPPED | OUTPATIENT
Start: 2018-02-05 | End: 2018-02-05

## 2018-02-05 RX ORDER — BLOOD-GLUCOSE METER
EACH MISCELLANEOUS
Qty: 1 KIT | Refills: 0 | Status: SHIPPED | OUTPATIENT
Start: 2018-02-05

## 2018-02-05 RX ORDER — PEN NEEDLE, DIABETIC 31 GX5/16"
1 NEEDLE, DISPOSABLE MISCELLANEOUS 4 TIMES DAILY
Qty: 200 EACH | Refills: 1 | Status: SHIPPED | OUTPATIENT
Start: 2018-02-05

## 2018-02-05 RX ADMIN — INSULIN GLARGINE 25 UNITS: 100 INJECTION, SOLUTION SUBCUTANEOUS at 08:04

## 2018-02-05 NOTE — PLAN OF CARE
Problem: Patient Care Overview  Goal: Plan of Care/Patient Progress Review  Outcome: Improving  Vital signs stable.  Lungs clear.  Bowel sounds hypoactive,no nausea, no bm.  Voiding.  Denies pain.  Accu checks, plan of care reviewed with pt and spouse.

## 2018-02-05 NOTE — PHARMACY - DISCHARGE MEDICATION RECONCILIATION AND EDUCATION
"Discharge medication review for this patient is complete. Face-to-face medication education was provided by the pharmacist.  See [facility] for allergy information and immunization status.   Pharmacist assisted with medication reconciliation of discharge medications with PTA medications.    Patient was informed to STOP taking the following HOME medications:   None    Patient was informed to START taking the following NEW medications:   Lantus 30 units every morning    Patient was informed to make the following changes to prior to admission medications:  None    Patient was educated on the following for each discharge medication:   Rationale for therapy  Duration of treatment  Dosing and or monitoring drug levels  Common side effects  Importance of compliance  Drug/food interactions  Missed doses  Self monitoring parameters    Left written materials and instructions (Clinical notes from [facility]) for new medications: Yes, provided pt with \"Understanding Diabetes\" booklet.    OUTCOMES: Patient verbalized understanding.    IMPORTANT FOLLOW UP NOTES:   Reviewed diabetic education focusing on \"survival skills\"  Current A1C 12.5. Goal A1C < 7.  Pt demonstrated ability to correctly calculate, prepare & self-administer insulin.  Pt will check BG four times daily (before meals and at bedtime).  Pt will keep a BG log and take to clinic visit. Provided pt with BG log book.  Pt will call physician if his BG > 250 for several readings.  Reminded pt that illness/sickness could worsen blood glucose.   Pt knows how to recognize and treat signs of hypoglycemia.   Pt will F/U with PCP within 1 week.    Discharge Medication List     Review of your medicines      START taking       Dose / Directions    Alcohol Prep Pads   Used for:  Type 2 diabetes mellitus with hyperosmolarity without coma, unspecified long term insulin use status (H)   Notes to Patient:  Use with blood sugar checks          Dose:  1 each   1 each 4 times daily   Quantity:  200 " each   Refills:  1       blood glucose lancets standard   Commonly known as:  no brand specified   Used for:  Type 2 diabetes mellitus with hyperosmolarity without coma, unspecified long term insulin use status (H)        Use to test blood sugar 3 times daily or as directed.   Quantity:  100 each   Refills:  11       blood glucose monitoring meter device kit   Used for:  Type 2 diabetes mellitus with hyperosmolarity without coma, unspecified long term insulin use status (H)        Use to test blood sugars 3 times daily or as directed.   Quantity:  1 kit   Refills:  0       * blood glucose monitoring test strip   Commonly known as:  no brand specified   Used for:  Type 2 diabetes mellitus with hyperosmolarity without coma, unspecified long term insulin use status (H)        Use to test blood sugars 4 times daily or as directed   Quantity:  100 strip   Refills:  1       * blood glucose monitoring test strip   Commonly known as:  no brand specified   Used for:  Type 2 diabetes mellitus with hyperosmolarity without coma, unspecified long term insulin use status (H)        Use to test blood sugars 3 times daily or as directed   Quantity:  100 strip   Refills:  1       insulin glargine 100 UNIT/ML injection   Commonly known as:  LANTUS SOLOSTAR   Used for:  Type 2 diabetes mellitus with hyperosmolarity without coma, unspecified long term insulin use status (H)        Dose:  30 Units   Inject 30 Units Subcutaneous every morning Lantus Solostar Pen   Quantity:  12 mL   Refills:  1       * Notice:  This list has 2 medication(s) that are the same as other medications prescribed for you. Read the directions carefully, and ask your doctor or other care provider to review them with you.      CONTINUE these medicines which have NOT CHANGED       Dose / Directions    losartan-hydrochlorothiazide 100-12.5 MG per tablet   Commonly known as:  HYZAAR        Dose:  1 tablet   Take 1 tablet by mouth At Bedtime   Refills:  0       ZOCOR  PO        Dose:  10 mg   Take 10 mg by mouth At Bedtime   Refills:  0            Where to get your medicines      These medications were sent to [place], [place] - [id number] [facility] [address]     Phone:  [phone]      Alcohol Prep Pads     blood glucose lancets standard     blood glucose monitoring meter device kit     blood glucose monitoring test strip         Some of these will need a paper prescription and others can be bought over the counter. Ask your nurse if you have questions.     Bring a paper prescription for each of these medications      blood glucose monitoring test strip     insulin glargine 100 UNIT/ML injection

## 2018-02-05 NOTE — DISCHARGE SUMMARY
Admit Date:     02/01/2018   Discharge Date:           DISCHARGE DIAGNOSES:   1.  Newly diagnosed diabetes mellitus type 2 with severe hyperglycemia with ketosis.   2.  Hypertension.   3.  Dyslipidemia.   4.  Episode of diarrhea.      DISPOSITION:  Home.      DISCHARGE MEDICATIONS:     Godfrey Varma   Home Medication Instructions REGLA:[id number]       Medication Information                      Alcohol Swabs (ALCOHOL PREP) PADS  1 each 4 times daily             blood glucose (NO BRAND SPECIFIED) lancets standard  Use to test blood sugar 3 times daily or as directed.             blood glucose monitoring (ACCU-CHEK MEENAKSHI PLUS) meter device kit  Use to test blood sugars 3 times daily or as directed.             blood glucose monitoring (NO BRAND SPECIFIED) test strip  Use to test blood sugars 4 times daily or as directed             blood glucose monitoring (NO BRAND SPECIFIED) test strip  Use to test blood sugars 3 times daily or as directed             insulin glargine (LANTUS SOLOSTAR) 100 UNIT/ML injection  Inject 30 Units Subcutaneous every morning Lantus Solostar Pen             losartan-hydrochlorothiazide (HYZAAR) 100-12.5 MG per tablet  Take 1 tablet by mouth At Bedtime             Simvastatin (ZOCOR PO)  Take 10 mg by mouth At Bedtime                 DISCHARGE CONDITION:  Stable.      DISCHARGE VITAL SIGNS:  Blood pressure 129/70, pulse rate 78, temperature 96, oxygen saturation 94%.   HEENT:  Pink nonicteric.  Extraocular muscle movement intact.   NECK:  Supple, no JVD, no thyromegaly.   CHEST:  Good air entry bilaterally.  No wheezing, crackles or rales.   CARDIOVASCULAR:  S1, S2 were heard.  No gallop or murmur.   ABDOMEN:  Soft, nontender, nondistended, positive bowel sounds, no organomegaly.   EXTREMITIES:  No edema, cyanosis or clubbing.   NEUROLOGIC:  Alert and oriented x3.  No weakness, numbness or memory loss.   PSYCHIATRIC:  Normal mood and affect, keeps eye contact, responds to questions appropriately.       PROCEDURES DONE:  None.      IMAGING STUDIES:  Include:  Chest x-ray which did not show any intrathoracic acute abnormality.      DISCHARGE LABORATORIES:  Sodium 147, potassium 3.5, BUN 17, creatinine 0.8, calcium 8.3, glucose 169.  WBC 7.9, hemoglobin 11.8, platelets 190,000.  Urinalysis is negative.  C. diff negative.      DISCHARGE DIET:  Carbohydrate-controlled diet.      DISCHARGE ACTIVITY:  As tolerated with encouraging activity.      DISCHARGE FOLLOWUP:    Primary care provider in 1 week.  The patient will check his glucose 3-4 times per day and document it in the chart and will discuss with his primary care provider to adjust his insulin dose.      HOSPITAL COURSE AND BRIEF HISTORY:  Godfrey Varma is a 77-year-old Cambodian-speaking gentleman who came to the emergency room on 02/01/2018 with weakness and polyuria.  In the emergency room, he was evaluated, found to be hyperglycemic.  On presentation, his glucose was noted to be 944 with positive ketones.  He was admitted to ICU, started on insulin drip for severe hyperglycemia with ketosis, with newly diagnosed diabetes mellitus type 2 with hemoglobin A1c of 12.5.  He did not have metabolic derangements, no anion gap, bicarbonate was normal.  pH was slightly on the low side, but that was due to respiratory acidosis and metabolic.  He was aggressively hydrated and kept in the ICU for a night, his glucose was corrected.  He was started on oral agent and transfer to the floor.  His glucose started to increase again, initiated on subcutaneous insulin, Lantus, titrated up with NovoLog insulin.        Due to diarrhea, I stopped his metformin.  Lantus dose was increased to 25 he units here.  Upon discharge, he wants to be discharged on sliding scale insulin or premeal insulin.  I increased his Lantus dose to 30 units every morning.  The patient will have his glucose checked at least 3-4 times a day.  I have it documented and discuss with his primary care provider.   The patient was educated on signs of hypoglycemia and to cut down on his Lantus if his glucose is on the low side, and document all the amount of insulin he takes every day, but at baseline 30 units is appropriate for him at this point.  I expect his caloric intake will increase upon discharge and is less likely to be hypoglycemic.      I also discussed with the patient and his wife and family members the need to follow up and get checked with his type 2 diabetes in clinic in 3-6 months.  The patient's insulin requirement needs to be evaluated and if hemoglobin A1c is down close to normal, there is a possibility that this patient can be transitioned back to oral agent,  but for now definitely he needs insulin as it is difficult to bring his hemoglobin A1c, glucose control down with oral agents that far.  All the patient's questions and concerns were addressed and agreed with the plan of discharge.      Total time spent coordinating his discharge face-to-face was over 30 minutes.         SWATHI QUCIK MD             D: 2018   T: 2018   MT: RICK      Name:     RISHABH MCKEON   MRN:      [id number]        Account:        [id number]   :      1940           Admit Date:     2018                                  Discharge Date:       Document: [id number]

## 2018-02-05 NOTE — PLAN OF CARE
Problem: Patient Care Overview  Goal: Plan of Care/Patient Progress Review  Outcome: Adequate for Discharge Date Met: 02/05/18  Pt being discharged to home today with the help of his family. Used the interpretor to explained DC paperwork including s/s to monitor for, diet, activity, blood sugar info, s/s to watch for both high and low Blood sugars, edu how to use machine and how to take own blood sugar checks.  New medications were discussed and general questions answered.  Pt will follow up with MD as directed. No other issues at this time. Pt and wife seem to understand and know what to do. Will follow up with patients family MD.

## 2018-02-05 NOTE — PROGRESS NOTES
Infection Prevention:    Enteric precautions discontinued, standard precautions sufficient. Please contact Infection Prevention with any questions/concerns at *[phone].    Jennifer Wynn, ICP

## 2018-02-05 NOTE — PLAN OF CARE
Problem: Patient Care Overview  Goal: Plan of Care/Patient Progress Review  Outcome: Improving   Patient A/O X 4. VSS Cambodian speaking but [place] to express basic needs  In English.Vital signs stable. Lungs clear.Bowel sounds active x 4. Voiding well. SBA , spouse in room and helps patient. Denies pain. Plan of care reviewed with pt and spouse.

## 2018-02-14 ENCOUNTER — CARE COORDINATION (OUTPATIENT)
Dept: CARE COORDINATION | Facility: CLINIC | Age: 78
End: 2018-02-14

## 2018-02-14 NOTE — PROGRESS NOTES
Clinic Care Coordination Contact  [facility] [facility] Out[facility]    Patient was enrolled in [facility] upon Discharged from: Hospital    Program Type: Diabetes    Program Length: 30 days post discharge    Clinical Data:  Outreach Type: Initial post discharge     (Insert screen shot from [facility] if applicable)      Action: Actions: Unable to Leave Voicemail               Plan: RN CC will continue to monitor patients responses. Will plan to outreach as needed and with any new variances or concerns.

## 2018-04-25 ENCOUNTER — PATIENT OUTREACH (OUTPATIENT)
Dept: FAMILY MEDICINE | Facility: CLINIC | Age: 78
End: 2018-04-25

## 2018-04-25 NOTE — PROGRESS NOTES
Clinic Care Coordination Contact  [facility]    Patient was enrolled in [facility]                Action:  Program complete        Plan:  case closed